# Patient Record
Sex: MALE | Race: WHITE | NOT HISPANIC OR LATINO | Employment: OTHER | URBAN - METROPOLITAN AREA
[De-identification: names, ages, dates, MRNs, and addresses within clinical notes are randomized per-mention and may not be internally consistent; named-entity substitution may affect disease eponyms.]

---

## 2023-08-30 LAB
ALANINE AMINOTRANSFERASE (SGPT) (U/L) IN SER/PLAS: 16 U/L (ref 10–52)
ALBUMIN (G/DL) IN SER/PLAS: 4.2 G/DL (ref 3.4–5)
ALKALINE PHOSPHATASE (U/L) IN SER/PLAS: 84 U/L (ref 33–136)
ANION GAP IN SER/PLAS: 12 MMOL/L (ref 10–20)
ASPARTATE AMINOTRANSFERASE (SGOT) (U/L) IN SER/PLAS: 23 U/L (ref 9–39)
BILIRUBIN TOTAL (MG/DL) IN SER/PLAS: 0.6 MG/DL (ref 0–1.2)
CALCIUM (MG/DL) IN SER/PLAS: 9.7 MG/DL (ref 8.6–10.6)
CARBON DIOXIDE, TOTAL (MMOL/L) IN SER/PLAS: 31 MMOL/L (ref 21–32)
CHLORIDE (MMOL/L) IN SER/PLAS: 101 MMOL/L (ref 98–107)
CREATININE (MG/DL) IN SER/PLAS: 1.07 MG/DL (ref 0.5–1.3)
ERYTHROCYTE DISTRIBUTION WIDTH (RATIO) BY AUTOMATED COUNT: 13.8 % (ref 11.5–14.5)
ERYTHROCYTE MEAN CORPUSCULAR HEMOGLOBIN CONCENTRATION (G/DL) BY AUTOMATED: 32 G/DL (ref 32–36)
ERYTHROCYTE MEAN CORPUSCULAR VOLUME (FL) BY AUTOMATED COUNT: 95 FL (ref 80–100)
ERYTHROCYTES (10*6/UL) IN BLOOD BY AUTOMATED COUNT: 4.36 X10E12/L (ref 4.5–5.9)
GFR MALE: 70 ML/MIN/1.73M2
GLUCOSE (MG/DL) IN SER/PLAS: 93 MG/DL (ref 74–99)
HEMATOCRIT (%) IN BLOOD BY AUTOMATED COUNT: 41.5 % (ref 41–52)
HEMOGLOBIN (G/DL) IN BLOOD: 13.3 G/DL (ref 13.5–17.5)
LEUKOCYTES (10*3/UL) IN BLOOD BY AUTOMATED COUNT: 9.5 X10E9/L (ref 4.4–11.3)
NRBC (PER 100 WBCS) BY AUTOMATED COUNT: 0 /100 WBC (ref 0–0)
PLATELETS (10*3/UL) IN BLOOD AUTOMATED COUNT: 335 X10E9/L (ref 150–450)
POTASSIUM (MMOL/L) IN SER/PLAS: 3.5 MMOL/L (ref 3.5–5.3)
PROTEIN TOTAL: 6.8 G/DL (ref 6.4–8.2)
SODIUM (MMOL/L) IN SER/PLAS: 140 MMOL/L (ref 136–145)
UREA NITROGEN (MG/DL) IN SER/PLAS: 18 MG/DL (ref 6–23)

## 2024-02-06 ENCOUNTER — HOSPITAL ENCOUNTER (EMERGENCY)
Facility: HOSPITAL | Age: 79
Discharge: HOME | End: 2024-02-06
Attending: EMERGENCY MEDICINE
Payer: MEDICARE

## 2024-02-06 ENCOUNTER — APPOINTMENT (OUTPATIENT)
Dept: RADIOLOGY | Facility: HOSPITAL | Age: 79
End: 2024-02-06
Payer: MEDICARE

## 2024-02-06 VITALS
HEIGHT: 70 IN | SYSTOLIC BLOOD PRESSURE: 192 MMHG | HEART RATE: 96 BPM | OXYGEN SATURATION: 97 % | BODY MASS INDEX: 31.5 KG/M2 | DIASTOLIC BLOOD PRESSURE: 95 MMHG | TEMPERATURE: 97.7 F | RESPIRATION RATE: 18 BRPM | WEIGHT: 220 LBS

## 2024-02-06 DIAGNOSIS — S01.81XA FACIAL LACERATION, INITIAL ENCOUNTER: ICD-10-CM
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
## 2024-02-06 PROCEDURE — 99285 EMERGENCY DEPT VISIT HI MDM: CPT | Mod: 25

## 2024-02-06 PROCEDURE — 12011 RPR F/E/E/N/L/M 2.5 CM/<: CPT | Performed by: EMERGENCY MEDICINE

## 2024-02-06 PROCEDURE — 70450 CT HEAD/BRAIN W/O DYE: CPT | Performed by: STUDENT IN AN ORGANIZED HEALTH CARE EDUCATION/TRAINING PROGRAM

## 2024-02-06 PROCEDURE — 72125 CT NECK SPINE W/O DYE: CPT | Performed by: STUDENT IN AN ORGANIZED HEALTH CARE EDUCATION/TRAINING PROGRAM

## 2024-02-06 PROCEDURE — 72125 CT NECK SPINE W/O DYE: CPT

## 2024-02-06 PROCEDURE — 70450 CT HEAD/BRAIN W/O DYE: CPT

## 2024-02-06 NOTE — ED TRIAGE NOTES
BIBA, patient unable to state what happened, does not remember, hx of Alzheimer's, on low dose ASA, per squad, patient [place] curb by the [place] hitting the left side of his head, had head lac on left side of upper face/head, no complaints of pain

## 2024-02-06 NOTE — ED PROVIDER NOTES
HPI   Chief Complaint   Patient presents with    Fall     BIBA, patient unable to state what happened, does not remember, hx of Alzheimer's, on low dose ASA, per squad, patient fell [place] a curb by the library hitting the left side of his head, had head lac on left side of upper face/head, no complaints of pain       Patient is a 78-year-old male, medical history significant for mild dementia, dizziness, who presents to the emergency department for fall.  Patient was on a community bus from [facility].  He apparently tripped and fell.  He struck his head.  There was no loss of consciousness.  Patient did suffer a laceration.  EMS was called to the scene and brought the patient in.  He denies feeling lightheaded prior to arrival.  He cannot recall all the details of the fall.  He states he is otherwise been in his normal state of health.                          No data recorded                   Patient History   Past Medical History:   Diagnosis Date    Disease of stomach and duodenum, unspecified 05/15/2013    Gastric and duodenal disease    Elevated prostate specific antigen (PSA)     Elevated prostate specific antigen (PSA)    Other conditions influencing health status     Nephrolithiasis    Other hypoglycemia 07/17/2017    Fasting low blood sugar    Other infective otitis externa, bilateral 04/08/2016    Other infective acute otitis externa of both ears    Other noninfective acute otitis externa, right ear 12/01/2016    Other noninfective acute otitis externa of right ear    Personal history of other diseases of male genital organs     History of benign prostatic hypertrophy    Personal history of other diseases of the digestive system 08/12/2019    History of gastroesophageal reflux (GERD)    Personal history of other diseases of the nervous system and sense organs 06/04/2019    History of blepharitis    Personal history of other diseases of the nervous system and sense organs 06/04/2019    History of cataract     Personal history of other diseases of the nervous system and sense organs 11/14/2013    History of acute otitis externa    Personal history of other diseases of the nervous system and sense organs     History of acute conjunctivitis    Personal history of other diseases of the respiratory system 07/09/2014    History of asthma    Personal history of other diseases of the respiratory system     Personal history of asthma    Personal history of other specified conditions 01/11/2016    History of dizziness    Personal history of other specified conditions 01/09/2014    History of diarrhea    Torticollis 08/07/2018    Torticollis, acute     Past Surgical History:   Procedure Laterality Date    CATARACT EXTRACTION  07/09/2014    Cataract Surgery    CATARACT EXTRACTION  07/09/2014    Cataract Surgery    COLONOSCOPY  05/15/2013    Complete Colonoscopy    HERNIA REPAIR  04/10/2013    Inguinal Hernia Repair    OTHER SURGICAL HISTORY  08/12/2019    Knee arthroscopy    OTHER SURGICAL HISTORY  12/17/2018    Cataract surgery    OTHER SURGICAL HISTORY  12/17/2018    YAG laser capsulotomy    OTHER SURGICAL HISTORY  06/26/2018    Laser Suite Retina Treatment Consisted Of    SEPTOPLASTY  05/15/2013    Septoplasty     No family history on file.  Social History     Tobacco Use    Smoking status: Not on file    Smokeless tobacco: Not on file   Substance Use Topics    Alcohol use: Not on file    Drug use: Not on file       Physical Exam   ED Triage Vitals [02/06/24 1620]   Temperature Heart Rate Respirations BP   36.5 °C (97.7 °F) 96 18 (!) 192/95      Pulse Ox Temp Source Heart Rate Source Patient Position   97 % Skin Monitor Sitting      BP Location FiO2 (%)     Left arm --       Physical Exam  Vitals and nursing note reviewed.   Constitutional:       General: He is not in acute distress.     Appearance: He is well-developed.   HENT:      Head: Normocephalic. Laceration present.        Comments: 2 cm full-thickness laceration just  above the left eyebrow.  No step-off.  Eyes:      Conjunctiva/sclera: Conjunctivae normal.   Cardiovascular:      Rate and Rhythm: Normal rate and regular rhythm.      Heart sounds: No murmur heard.  Pulmonary:      Effort: Pulmonary effort is normal. No respiratory distress.      Breath sounds: Normal breath sounds.   Abdominal:      Palpations: Abdomen is soft.      Tenderness: There is no abdominal tenderness.   Musculoskeletal:         General: No swelling.      Cervical back: Neck supple.   Skin:     General: Skin is warm and dry.      Capillary Refill: Capillary refill takes less than 2 seconds.   Neurological:      Mental Status: He is alert.   Psychiatric:         Mood and Affect: Mood normal.         ED Course & MDM   ED Course as of 02/06/24 1737 Tue Feb 06, 2024 1735 CT head shows no acute evidence of intracranial hemorrhage.  CT cervical spine shows no fracture or dislocation. [MK]      ED Course User Index  [MK] Favian Parada MD         Diagnoses as of 02/06/24 1737   Fall, initial encounter   Facial laceration, initial encounter       Medical Decision Making  Medical Decision Making: Patient presents emergency department after fall.  He did strike his head.  He has a laceration that was irrigated and repaired.  See procedure note.  Given patient's advanced age, I did obtain CT imaging.  CT shows no evidence of acute intracranial hemorrhage.  CT cervical spine is also unremarkable.  At this point, patient has no further complaints.  I do feel that he is safe for discharge back to his nursing facility.    Differential Diagnoses Considered: Laceration, skull fracture, intracranial hemorrhage, concussion    Chronic Medical Conditions Significantly Affecting Care: Dementia, BPH    External Records Reviewed: I reviewed recent and relevant outside records including: Medication reconciliation    Independent Interpretation of Studies:  I independently interpreted: CT is obtained reviewed    Escalation  of Care:  Appropriate for outpatient management    Social Determinants of Health Significantly Affecting Care:  No social determinants    Prescription Drug Consideration: Tetanus update    Diagnostic testing considered: Not contributory            Procedure  Procedures     Favian Parada MD  02/06/24 [id number]

## 2024-02-07 ENCOUNTER — OFFICE VISIT (OUTPATIENT)
Dept: NEUROLOGY | Facility: CLINIC | Age: 79
End: 2024-02-07
Payer: MEDICARE

## 2024-02-07 VITALS
HEART RATE: 88 BPM | HEIGHT: 68 IN | SYSTOLIC BLOOD PRESSURE: 116 MMHG | WEIGHT: 183.1 LBS | BODY MASS INDEX: 27.75 KG/M2 | DIASTOLIC BLOOD PRESSURE: 71 MMHG

## 2024-02-07 DIAGNOSIS — R41.89 COGNITIVE IMPAIRMENT: ICD-10-CM

## 2024-02-07 DIAGNOSIS — G04.90 ENCEPHALITIS (HHS-HCC): Primary | ICD-10-CM

## 2024-02-07 PROCEDURE — 1126F AMNT PAIN NOTED NONE PRSNT: CPT | Performed by: PSYCHIATRY & NEUROLOGY

## 2024-02-07 PROCEDURE — 1159F MED LIST DOCD IN RCRD: CPT | Performed by: PSYCHIATRY & NEUROLOGY

## 2024-02-07 PROCEDURE — 1157F ADVNC CARE PLAN IN RCRD: CPT | Performed by: PSYCHIATRY & NEUROLOGY

## 2024-02-07 PROCEDURE — 99213 OFFICE O/P EST LOW 20 MIN: CPT | Performed by: PSYCHIATRY & NEUROLOGY

## 2024-02-07 PROCEDURE — 1036F TOBACCO NON-USER: CPT | Performed by: PSYCHIATRY & NEUROLOGY

## 2024-02-07 RX ORDER — FINASTERIDE 5 MG/1
5 TABLET, FILM COATED ORAL EVERY 24 HOURS
COMMUNITY
Start: 2013-04-01

## 2024-02-07 RX ORDER — ACETAMINOPHEN 325 MG/1
325 TABLET ORAL
COMMUNITY
Start: 2023-07-03

## 2024-02-07 RX ORDER — PREDNISOLONE ACETATE 10 MG/ML
1 SUSPENSION/ DROPS OPHTHALMIC
COMMUNITY
Start: 2024-01-08

## 2024-02-07 RX ORDER — NAPROXEN SODIUM 220 MG/1
81 TABLET, FILM COATED ORAL DAILY
COMMUNITY
Start: 2024-02-02

## 2024-02-07 RX ORDER — METOPROLOL SUCCINATE 25 MG/1
25 TABLET, EXTENDED RELEASE ORAL DAILY
COMMUNITY
Start: 2020-10-15

## 2024-02-07 RX ORDER — AMITRIPTYLINE HYDROCHLORIDE 10 MG/1
10 TABLET, FILM COATED ORAL EVERY 24 HOURS
COMMUNITY
Start: 2022-05-05

## 2024-02-07 RX ORDER — OXCARBAZEPINE 300 MG/1
300 TABLET, FILM COATED ORAL 2 TIMES DAILY
COMMUNITY
Start: 2024-02-02

## 2024-02-07 RX ORDER — HYDROCHLOROTHIAZIDE 25 MG/1
25 TABLET ORAL EVERY 24 HOURS
COMMUNITY
Start: 2013-01-27

## 2024-02-07 RX ORDER — ATORVASTATIN CALCIUM 80 MG/1
80 TABLET, FILM COATED ORAL DAILY
COMMUNITY
Start: 2020-10-15

## 2024-02-07 ASSESSMENT — PAIN SCALES - GENERAL: PAINLEVEL: 0-NO PAIN

## 2024-02-07 NOTE — PROGRESS NOTES
"Subjective   Timo Crawford is a 78 y.o. male who comes in for follow up of face pain.     He is having some memory problems.  He has had a worsening of his memory.  He had the shingles two years ago this month.  He is aware that he is confused and having difficult getting words.   He reports that he reads a lot. He has difficulty remembering peoples names.  He was getting speech therapy. He was getting that in the facility.      He still does his adls - dressing, bathing however they are in the memory care unit so they do the medications, meals etc.  They have a POA that does all their finances.      She is now sundowning at night.  This has been happening since October.      The face pain is mostly gone at this time.       Review of Systems    Objective   /71 (BP Location: Right arm, Patient Position: Sitting, BP Cuff Size: Adult)   Pulse 88   Ht 1.727 m (5' 8\")   Wt 83.1 kg (183 lb 1.6 oz)   BMI 27.84 kg/m²   Neurological Exam  Physical Exam    Patient unable to draw clock today - put numbers on clock (almost ran out of space however got them all on) however did not understand how to put     Assessment/Plan   Mr. Crawford is a 78 year old man presenting for follow up of post-herpetic neuraglia and cognitive impairment. Pain has resolved with low dose oxcarbazepine. Will continue and check RFP today.    Cognition has worsened after being stable per patient. Given history of encephalitis will get MRI brain to follow up and consider starting Aricept depending on results. Recommend to facility to stop amitriptyline which might be contributing to sundowning.     Follow up in 6 months.   "

## 2024-02-07 NOTE — PATIENT INSTRUCTIONS
Continue the oxcarbazepine for the face pain.  I would recommend that the amitriptyline be stopped as it can cause sundowning and confusion at night. To follow up with memory changes will get an MRI brain w/wo contrast.  Depending on the results will start a medication for memory.  Start speech therapy for cognitive therapy. Follow up in 6 months.

## 2024-02-19 NOTE — ED PROCEDURE NOTE
Procedure  Laceration Repair    Performed by: Favian Parada MD  Authorized by: Favian Parada MD    Consent:     Consent obtained:  Verbal    Consent given by:  Patient    Risks, benefits, and alternatives were discussed: yes      Risks discussed:  Infection, pain and retained foreign body  Anesthesia:     Anesthesia method:  Local infiltration    Local anesthetic:  Lidocaine 1% WITH epi  Laceration details:     Location:  Face    Face location:  L eyebrow    Length (cm):  2    Depth (mm):  3  Pre-procedure details:     Preparation:  Patient was prepped and draped in usual sterile fashion and imaging obtained to evaluate for foreign bodies  Exploration:     Hemostasis achieved with:  Direct pressure    Imaging outcome: foreign body not noted      Wound exploration: wound explored through full range of motion    Treatment:     Area cleansed with:  Saline    Amount of cleaning:  Standard    Irrigation solution:  Sterile saline    Irrigation method:  Syringe    Visualized foreign bodies/material removed: no      Debridement:  None    Undermining:  None    Scar revision: no    Skin repair:     Repair method:  Sutures    Suture size:  5-0    Suture material:  Nylon    Suture technique:  Simple interrupted    Number of sutures:  3  Approximation:     Approximation:  Close  Repair type:     Repair type:  Simple  Post-procedure details:     Dressing:  Open (no dressing)    Procedure completion:  Tolerated               Favian Parada MD  02/19/24 [id number]

## 2024-03-04 ENCOUNTER — APPOINTMENT (OUTPATIENT)
Dept: RADIOLOGY | Facility: HOSPITAL | Age: 79
End: 2024-03-04
Payer: MEDICARE

## 2024-03-06 ENCOUNTER — TELEPHONE (OUTPATIENT)
Dept: NEUROLOGY | Facility: CLINIC | Age: 79
End: 2024-03-06
Payer: MEDICARE

## 2024-03-06 NOTE — TELEPHONE ENCOUNTER
----- Message from Anne Mera DO sent at 3/5/2024  8:57 AM EST -----  Can you call the facility back about this note? My understanding is that this medication change greatly helped his pain, do they agree? What time of the day are they seeing the behavior changes.   ----- Message -----  From: Haylee Gallegos MA  Sent: 3/1/2024   9:14 AM EST  To: Anne Mera, DO    Please advise

## 2024-03-06 NOTE — TELEPHONE ENCOUNTER
----- Message from Anne Mera DO sent at 3/6/2024  3:50 PM EST -----  Ok they can update me after he has recovered from his current infection.   ----- Message -----  From: Haylee Gallegos MA  Sent: 3/5/2024  10:35 AM EST  To: Anne Mera DO    Spoke to a Haylee [nurse]  from the facility--  tested + for Covid on 2/29/24 when the fax was sent over. She  said it's hard to tell if what is going on is due to Covid or something else.  ----- Message -----  From: Anne Mera DO  Sent: 3/5/2024   8:58 AM EST  To: Haylee Gallegos MA    Can you call the facility back about this note? My understanding is that this medication change greatly helped his pain, do they agree? What time of the day are they seeing the behavior changes.   ----- Message -----  From: Haylee Gallegos MA  Sent: 3/1/2024   9:14 AM EST  To: Anne Mera DO    Please advise

## 2024-03-13 ENCOUNTER — HOSPITAL ENCOUNTER (OUTPATIENT)
Dept: RADIOLOGY | Facility: HOSPITAL | Age: 79
Discharge: HOME | End: 2024-03-13
Payer: MEDICARE

## 2024-03-13 DIAGNOSIS — G04.90 ENCEPHALITIS (HHS-HCC): ICD-10-CM

## 2024-03-13 PROCEDURE — 2550000001 HC RX 255 CONTRASTS: Performed by: PSYCHIATRY & NEUROLOGY

## 2024-03-13 PROCEDURE — 70553 MRI BRAIN STEM W/O & W/DYE: CPT | Performed by: STUDENT IN AN ORGANIZED HEALTH CARE EDUCATION/TRAINING PROGRAM

## 2024-03-13 PROCEDURE — A9575 INJ GADOTERATE MEGLUMI 0.1ML: HCPCS | Performed by: PSYCHIATRY & NEUROLOGY

## 2024-03-13 PROCEDURE — 70553 MRI BRAIN STEM W/O & W/DYE: CPT

## 2024-03-13 RX ORDER — GADOTERATE MEGLUMINE 376.9 MG/ML
16 INJECTION INTRAVENOUS
Status: COMPLETED | OUTPATIENT
Start: 2024-03-13 | End: 2024-03-13

## 2024-03-13 RX ADMIN — GADOTERATE MEGLUMINE 16 ML: 376.9 INJECTION INTRAVENOUS at 14:15

## 2024-07-17 ENCOUNTER — NURSE TRIAGE (OUTPATIENT)
Dept: PRIMARY CARE | Facility: CLINIC | Age: 79
End: 2024-07-17
Payer: MEDICARE

## 2024-08-29 ENCOUNTER — APPOINTMENT (OUTPATIENT)
Dept: NEUROLOGY | Facility: CLINIC | Age: 79
End: 2024-08-29
Payer: MEDICARE

## 2024-08-29 VITALS
HEIGHT: 71 IN | HEART RATE: 84 BPM | SYSTOLIC BLOOD PRESSURE: 124 MMHG | WEIGHT: 167.3 LBS | DIASTOLIC BLOOD PRESSURE: 73 MMHG | BODY MASS INDEX: 23.42 KG/M2

## 2024-08-29 DIAGNOSIS — R41.89 COGNITIVE IMPAIRMENT: Primary | ICD-10-CM

## 2024-08-29 DIAGNOSIS — G04.90 ENCEPHALITIS (HHS-HCC): ICD-10-CM

## 2024-08-29 DIAGNOSIS — B02.29 POST HERPETIC NEURALGIA: ICD-10-CM

## 2024-08-29 PROCEDURE — 1159F MED LIST DOCD IN RCRD: CPT | Performed by: PSYCHIATRY & NEUROLOGY

## 2024-08-29 PROCEDURE — 1157F ADVNC CARE PLAN IN RCRD: CPT | Performed by: PSYCHIATRY & NEUROLOGY

## 2024-08-29 PROCEDURE — 1126F AMNT PAIN NOTED NONE PRSNT: CPT | Performed by: PSYCHIATRY & NEUROLOGY

## 2024-08-29 PROCEDURE — 99214 OFFICE O/P EST MOD 30 MIN: CPT | Performed by: PSYCHIATRY & NEUROLOGY

## 2024-08-29 ASSESSMENT — PATIENT HEALTH QUESTIONNAIRE - PHQ9
6. FEELING BAD ABOUT YOURSELF - OR THAT YOU ARE A FAILURE OR HAVE LET YOURSELF OR YOUR FAMILY DOWN: SEVERAL DAYS
8. MOVING OR SPEAKING SO SLOWLY THAT OTHER PEOPLE COULD HAVE NOTICED. OR THE OPPOSITE, BEING SO FIGETY OR RESTLESS THAT YOU HAVE BEEN MOVING AROUND A LOT MORE THAN USUAL: NEARLY EVERY DAY
9. THOUGHTS THAT YOU WOULD BE BETTER OFF DEAD, OR OF HURTING YOURSELF: NOT AT ALL
SUM OF ALL RESPONSES TO PHQ QUESTIONS 1-9: 12
7. TROUBLE CONCENTRATING ON THINGS, SUCH AS READING THE NEWSPAPER OR WATCHING TELEVISION: NEARLY EVERY DAY
1. LITTLE INTEREST OR PLEASURE IN DOING THINGS: NEARLY EVERY DAY
SUM OF ALL RESPONSES TO PHQ9 QUESTIONS 1 AND 2: 3
2. FEELING DOWN, DEPRESSED OR HOPELESS: NOT AT ALL
5. POOR APPETITE OR OVEREATING: SEVERAL DAYS
4. FEELING TIRED OR HAVING LITTLE ENERGY: SEVERAL DAYS
3. TROUBLE FALLING OR STAYING ASLEEP OR SLEEPING TOO MUCH: NOT AT ALL

## 2024-08-29 ASSESSMENT — PAIN SCALES - GENERAL: PAINLEVEL: 0-NO PAIN

## 2024-08-29 ASSESSMENT — ENCOUNTER SYMPTOMS
CONFUSION: 1
SPEECH DIFFICULTY: 1

## 2024-08-29 NOTE — PROGRESS NOTES
"Subjective   Timo Crawford is a 79 y.o. male who comes in for follow up of memory loss and post-herpetic neuralgia.    He was getting speech therapy and was much more lucid while doing the therapy.  They are felt that was really helping him.  They would not approve more since he had not had neurology follow up.    He enjoyed having her come to visit him and do the therapy.  Taking a nap in the afternoon was reducing the sundowning.  He is sleeping a lot both during the day and at night. He does go do daily exercises in the facility.  He walks a lot and all over the facility.   He does not have any problems recognizing people. He does not talk at much as he used too.  Sometimes he uses made up words.     They stopped the amitriptyline and that helped with his confusion.     Review of Systems   Neurological:  Positive for speech difficulty.   Psychiatric/Behavioral:  Positive for confusion.    All other systems reviewed and are negative.      Objective   /73 (BP Location: Right arm, Patient Position: Sitting, BP Cuff Size: Adult)   Pulse 84   Ht 1.803 m (5' 11\")   Wt 75.9 kg (167 lb 4.8 oz)   BMI 23.33 kg/m²   Neurological Exam  Physical Exam    Limited speech output.  Unable to draw a clock - make a [place], draws lines from center to 3, however unable to place numbers even with prompts.         MR brain w and wo IV contrast    Result Date: 3/13/2024  Interpreted By:  Alexsander Syed,  and Elia Carney STUDY: MR BRAIN W AND WO IV CONTRAST;  3/13/2024 2:35 pm   INDICATION: Signs/Symptoms: worsening cognitive changes, history of neuro-shingles..   COMPARISON: CT head dated 02/06/2024 and multiple prior brain MRIs, most recently 05/13/2022   ACCESSION NUMBER(S): [id number]   ORDERING CLINICIAN: PACHECO MELO   TECHNIQUE: Axial T2, FLAIR, DWI, gradient echo T2 and T1 weighted images of brain were acquired. Post contrast T1 weighted images were acquired after administration of 16 mL Dotarem gadolinium " based intravenous contrast.   FINDINGS: CSF Spaces: The ventricles, sulci and basal cisterns are appropriate for the degree of volume loss. No abnormal extra-axial fluid collection.   Parenchyma: Generalized parenchymal loss, similar to previous. The previously noted signal abnormality in the right aspect of the kaiden and medulla is no longer visualized. No restricted diffusion to suggest acute infarction. Additional nonspecific T2 hyperintense signal in the white matter is similar to previous and likely secondary to chronic small vessel ischemic disease. There is no mass effect or midline shift. No evidence of intracranial hemorrhage. No abnormal enhancement.   Paranasal Sinuses and Mastoids: Scattered paranasal sinus mucosal thickening. The mastoid air cells are clear.   Orbits: Bilateral lens replacements.       Resolution of the previously noted abnormal brainstem signal, no acute intracranial pathology.   I personally reviewed the images/study and I agree with the findings as stated by resident physician Dr. Mike Rodrigez.   MACRO: None   Signed by: Alexsander Syed 3/13/2024 4:14 PM Dictation workstation:   [id number]          Assessment/Plan   Mr. Crawford is a 78 year old man presenting for follow up of post-herpetic neuraglia and cognitive impairment. Pain has resolved with low dose oxcarbazepine. Will try to lower to 150 mg BID.  Check renal function panel.      Cognition improved with speech therapy however insurance did not approve for it to continue. Will re-order. Consider adding Aricept.    Follow up in 6 months.    Anne Mera DO

## 2024-08-29 NOTE — PATIENT INSTRUCTIONS
Will restart the speech therapy. Depending on response can consider starting medication for memory. You can call me and if you are not seeing the same benefit from the speech therapy or its not persistent improvement then will added on the medication.      Will try to cut the oxcarbazepine to 150 mg twice a day since you are not having the pain from the shingles any longer. If the pain comes back with lowering the dose then increase it back to 300  mg twice a day.     Follow up in 6 months.

## 2024-09-21 ENCOUNTER — APPOINTMENT (OUTPATIENT)
Dept: RADIOLOGY | Facility: HOSPITAL | Age: 79
DRG: 101 | End: 2024-09-21
Payer: MEDICARE

## 2024-09-21 ENCOUNTER — HOSPITAL ENCOUNTER (OUTPATIENT)
Facility: HOSPITAL | Age: 79
Setting detail: OBSERVATION
DRG: 101 | End: 2024-09-21
Attending: EMERGENCY MEDICINE | Admitting: INTERNAL MEDICINE
Payer: MEDICARE

## 2024-09-21 ENCOUNTER — APPOINTMENT (OUTPATIENT)
Dept: CARDIOLOGY | Facility: HOSPITAL | Age: 79
DRG: 101 | End: 2024-09-21
Payer: MEDICARE

## 2024-09-21 DIAGNOSIS — R56.9 SEIZURE (MULTI): ICD-10-CM

## 2024-09-21 DIAGNOSIS — R00.1 BRADYCARDIA: ICD-10-CM

## 2024-09-21 DIAGNOSIS — R55 SYNCOPE AND COLLAPSE: ICD-10-CM

## 2024-09-21 DIAGNOSIS — R40.4 UNRESPONSIVE EPISODE: Primary | ICD-10-CM

## 2024-09-21 DIAGNOSIS — I25.2 HISTORY OF NON-ST ELEVATION MYOCARDIAL INFARCTION (NSTEMI): ICD-10-CM

## 2024-09-21 DIAGNOSIS — G45.9 TRANSIENT CEREBRAL ISCHEMIA, UNSPECIFIED TYPE: ICD-10-CM

## 2024-09-21 PROBLEM — I25.10 CORONARY ARTERY DISEASE: Status: ACTIVE | Noted: 2022-05-26

## 2024-09-21 PROBLEM — I10 HYPERTENSION: Status: ACTIVE | Noted: 2024-09-21

## 2024-09-21 PROBLEM — G04.90 ENCEPHALITIS (HHS-HCC): Status: ACTIVE | Noted: 2024-09-21

## 2024-09-21 PROBLEM — J45.909 ASTHMA: Status: ACTIVE | Noted: 2024-09-21

## 2024-09-21 PROBLEM — E78.5 HYPERLIPIDEMIA: Status: ACTIVE | Noted: 2022-05-26

## 2024-09-21 PROBLEM — R13.12 DYSPHAGIA, OROPHARYNGEAL PHASE: Status: ACTIVE | Noted: 2022-05-26

## 2024-09-21 PROBLEM — N40.0 BENIGN PROSTATIC HYPERPLASIA: Status: ACTIVE | Noted: 2024-09-21

## 2024-09-21 PROBLEM — I10 BENIGN ESSENTIAL HYPERTENSION: Status: ACTIVE | Noted: 2024-09-21
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
## 2024-09-21 LAB
ALBUMIN SERPL BCP-MCNC: 3.9 G/DL (ref 3.4–5)
ALP SERPL-CCNC: 69 U/L (ref 33–136)
ALT SERPL W P-5'-P-CCNC: 17 U/L (ref 10–52)
ANION GAP BLDV CALCULATED.4IONS-SCNC: 8 MMOL/L (ref 10–25)
ANION GAP SERPL CALC-SCNC: 12 MMOL/L (ref 10–20)
AST SERPL W P-5'-P-CCNC: 21 U/L (ref 9–39)
BASE EXCESS BLDV CALC-SCNC: 1.7 MMOL/L (ref -2–3)
BASOPHILS # BLD AUTO: 0.04 X10*3/UL (ref 0–0.1)
BASOPHILS NFR BLD AUTO: 0.5 %
BILIRUB DIRECT SERPL-MCNC: 0.1 MG/DL (ref 0–0.3)
BILIRUB SERPL-MCNC: 0.7 MG/DL (ref 0–1.2)
BNP SERPL-MCNC: 46 PG/ML (ref 0–99)
BODY TEMPERATURE: 37 DEGREES CELSIUS
BUN SERPL-MCNC: 20 MG/DL (ref 6–23)
CA-I BLDV-SCNC: 1.17 MMOL/L (ref 1.1–1.33)
CALCIUM SERPL-MCNC: 9.4 MG/DL (ref 8.6–10.3)
CARDIAC TROPONIN I PNL SERPL HS: 5 NG/L (ref 0–20)
CARDIAC TROPONIN I PNL SERPL HS: 6 NG/L (ref 0–20)
CHLORIDE BLDV-SCNC: 104 MMOL/L (ref 98–107)
CHLORIDE SERPL-SCNC: 101 MMOL/L (ref 98–107)
CO2 SERPL-SCNC: 27 MMOL/L (ref 21–32)
CREAT SERPL-MCNC: 0.95 MG/DL (ref 0.5–1.3)
EGFRCR SERPLBLD CKD-EPI 2021: 81 ML/MIN/1.73M*2
EOSINOPHIL # BLD AUTO: 0.19 X10*3/UL (ref 0–0.4)
EOSINOPHIL NFR BLD AUTO: 2.3 %
ERYTHROCYTE [DISTWIDTH] IN BLOOD BY AUTOMATED COUNT: 13.7 % (ref 11.5–14.5)
GLUCOSE BLD MANUAL STRIP-MCNC: 108 MG/DL (ref 74–99)
GLUCOSE BLDV-MCNC: 94 MG/DL (ref 74–99)
GLUCOSE SERPL-MCNC: 112 MG/DL (ref 74–99)
HCO3 BLDV-SCNC: 26.6 MMOL/L (ref 22–26)
HCT VFR BLD AUTO: 39.5 % (ref 41–52)
HCT VFR BLD EST: 38 % (ref 41–52)
HGB BLD-MCNC: 13.6 G/DL (ref 13.5–17.5)
HGB BLDV-MCNC: 12.6 G/DL (ref 13.5–17.5)
IMM GRANULOCYTES # BLD AUTO: 0.04 X10*3/UL (ref 0–0.5)
IMM GRANULOCYTES NFR BLD AUTO: 0.5 % (ref 0–0.9)
INHALED O2 CONCENTRATION: 21 %
LACTATE BLDV-SCNC: 0.9 MMOL/L (ref 0.4–2)
LYMPHOCYTES # BLD AUTO: 1.66 X10*3/UL (ref 0.8–3)
LYMPHOCYTES NFR BLD AUTO: 19.9 %
MAGNESIUM SERPL-MCNC: 1.59 MG/DL (ref 1.6–2.4)
MCH RBC QN AUTO: 32.2 PG (ref 26–34)
MCHC RBC AUTO-ENTMCNC: 34.4 G/DL (ref 32–36)
MCV RBC AUTO: 93 FL (ref 80–100)
MONOCYTES # BLD AUTO: 0.72 X10*3/UL (ref 0.05–0.8)
MONOCYTES NFR BLD AUTO: 8.6 %
NEUTROPHILS # BLD AUTO: 5.69 X10*3/UL (ref 1.6–5.5)
NEUTROPHILS NFR BLD AUTO: 68.2 %
NRBC BLD-RTO: 0 /100 WBCS (ref 0–0)
OXYHGB MFR BLDV: 57.1 % (ref 45–75)
PCO2 BLDV: 42 MM HG (ref 41–51)
PH BLDV: 7.41 PH (ref 7.33–7.43)
PLATELET # BLD AUTO: 337 X10*3/UL (ref 150–450)
PO2 BLDV: 38 MM HG (ref 35–45)
POTASSIUM BLDV-SCNC: 3.3 MMOL/L (ref 3.5–5.3)
POTASSIUM SERPL-SCNC: 3.6 MMOL/L (ref 3.5–5.3)
PROT SERPL-MCNC: 6.5 G/DL (ref 6.4–8.2)
RBC # BLD AUTO: 4.23 X10*6/UL (ref 4.5–5.9)
SAO2 % BLDV: 59 % (ref 45–75)
SODIUM BLDV-SCNC: 135 MMOL/L (ref 136–145)
SODIUM SERPL-SCNC: 136 MMOL/L (ref 136–145)
WBC # BLD AUTO: 8.3 X10*3/UL (ref 4.4–11.3)

## 2024-09-21 PROCEDURE — 84484 ASSAY OF TROPONIN QUANT: CPT | Performed by: EMERGENCY MEDICINE

## 2024-09-21 PROCEDURE — 36415 COLL VENOUS BLD VENIPUNCTURE: CPT | Performed by: EMERGENCY MEDICINE

## 2024-09-21 PROCEDURE — 84132 ASSAY OF SERUM POTASSIUM: CPT | Performed by: EMERGENCY MEDICINE

## 2024-09-21 PROCEDURE — 84075 ASSAY ALKALINE PHOSPHATASE: CPT | Performed by: EMERGENCY MEDICINE

## 2024-09-21 PROCEDURE — 96365 THER/PROPH/DIAG IV INF INIT: CPT | Mod: 59

## 2024-09-21 PROCEDURE — 83880 ASSAY OF NATRIURETIC PEPTIDE: CPT | Performed by: EMERGENCY MEDICINE

## 2024-09-21 PROCEDURE — 2500000004 HC RX 250 GENERAL PHARMACY W/ HCPCS (ALT 636 FOR OP/ED): Performed by: EMERGENCY MEDICINE

## 2024-09-21 PROCEDURE — 93005 ELECTROCARDIOGRAM TRACING: CPT

## 2024-09-21 PROCEDURE — 82947 ASSAY GLUCOSE BLOOD QUANT: CPT

## 2024-09-21 PROCEDURE — 85025 COMPLETE CBC W/AUTO DIFF WBC: CPT | Performed by: EMERGENCY MEDICINE

## 2024-09-21 PROCEDURE — 2500000004 HC RX 250 GENERAL PHARMACY W/ HCPCS (ALT 636 FOR OP/ED): Performed by: INTERNAL MEDICINE

## 2024-09-21 PROCEDURE — G0378 HOSPITAL OBSERVATION PER HR: HCPCS

## 2024-09-21 PROCEDURE — 70450 CT HEAD/BRAIN W/O DYE: CPT | Performed by: STUDENT IN AN ORGANIZED HEALTH CARE EDUCATION/TRAINING PROGRAM

## 2024-09-21 PROCEDURE — 70450 CT HEAD/BRAIN W/O DYE: CPT

## 2024-09-21 PROCEDURE — 96372 THER/PROPH/DIAG INJ SC/IM: CPT | Performed by: INTERNAL MEDICINE

## 2024-09-21 PROCEDURE — 83735 ASSAY OF MAGNESIUM: CPT | Performed by: EMERGENCY MEDICINE

## 2024-09-21 PROCEDURE — 99285 EMERGENCY DEPT VISIT HI MDM: CPT

## 2024-09-21 PROCEDURE — 96366 THER/PROPH/DIAG IV INF ADDON: CPT

## 2024-09-21 PROCEDURE — 99223 1ST HOSP IP/OBS HIGH 75: CPT | Performed by: INTERNAL MEDICINE

## 2024-09-21 PROCEDURE — 2500000001 HC RX 250 WO HCPCS SELF ADMINISTERED DRUGS (ALT 637 FOR MEDICARE OP): Performed by: INTERNAL MEDICINE

## 2024-09-21 PROCEDURE — 2500000002 HC RX 250 W HCPCS SELF ADMINISTERED DRUGS (ALT 637 FOR MEDICARE OP, ALT 636 FOR OP/ED): Performed by: INTERNAL MEDICINE

## 2024-09-21 PROCEDURE — 71046 X-RAY EXAM CHEST 2 VIEWS: CPT

## 2024-09-21 RX ORDER — AMITRIPTYLINE HYDROCHLORIDE 10 MG/1
10 TABLET, FILM COATED ORAL NIGHTLY
Status: DISCONTINUED | OUTPATIENT
Start: 2024-09-21 | End: 2024-09-25 | Stop reason: HOSPADM

## 2024-09-21 RX ORDER — DORZOLAMIDE HYDROCHLORIDE AND TIMOLOL MALEATE 20; 5 MG/ML; MG/ML
1 SOLUTION/ DROPS OPHTHALMIC 2 TIMES DAILY
Status: DISCONTINUED | OUTPATIENT
Start: 2024-09-21 | End: 2024-09-25 | Stop reason: HOSPADM

## 2024-09-21 RX ORDER — OXCARBAZEPINE 150 MG/1
150 TABLET, FILM COATED ORAL 2 TIMES DAILY
Status: DISCONTINUED | OUTPATIENT
Start: 2024-09-21 | End: 2024-09-22

## 2024-09-21 RX ORDER — DORZOLAMIDE HYDROCHLORIDE AND TIMOLOL MALEATE 20; 5 MG/ML; MG/ML
1 SOLUTION/ DROPS OPHTHALMIC 2 TIMES DAILY
COMMUNITY
Start: 2024-08-02

## 2024-09-21 RX ORDER — ACETAMINOPHEN 325 MG/1
650 TABLET ORAL EVERY 4 HOURS PRN
Status: DISCONTINUED | OUTPATIENT
Start: 2024-09-21 | End: 2024-09-25 | Stop reason: HOSPADM

## 2024-09-21 RX ORDER — METOPROLOL SUCCINATE 25 MG/1
25 TABLET, EXTENDED RELEASE ORAL DAILY
Status: DISCONTINUED | OUTPATIENT
Start: 2024-09-21 | End: 2024-09-25 | Stop reason: HOSPADM

## 2024-09-21 RX ORDER — ACETAMINOPHEN 650 MG/1
650 SUPPOSITORY RECTAL EVERY 4 HOURS PRN
Status: DISCONTINUED | OUTPATIENT
Start: 2024-09-21 | End: 2024-09-25 | Stop reason: HOSPADM

## 2024-09-21 RX ORDER — ENOXAPARIN SODIUM 100 MG/ML
40 INJECTION SUBCUTANEOUS EVERY 24 HOURS
Status: DISCONTINUED | OUTPATIENT
Start: 2024-09-21 | End: 2024-09-25 | Stop reason: HOSPADM

## 2024-09-21 RX ORDER — NAPROXEN SODIUM 220 MG/1
81 TABLET, FILM COATED ORAL DAILY
Status: DISCONTINUED | OUTPATIENT
Start: 2024-09-21 | End: 2024-09-25 | Stop reason: HOSPADM

## 2024-09-21 RX ORDER — FINASTERIDE 5 MG/1
5 TABLET, FILM COATED ORAL DAILY
Status: DISCONTINUED | OUTPATIENT
Start: 2024-09-21 | End: 2024-09-25 | Stop reason: HOSPADM

## 2024-09-21 RX ORDER — TALC
3 POWDER (GRAM) TOPICAL NIGHTLY PRN
Status: DISCONTINUED | OUTPATIENT
Start: 2024-09-21 | End: 2024-09-23

## 2024-09-21 RX ORDER — SODIUM CHLORIDE, SODIUM LACTATE, POTASSIUM CHLORIDE, CALCIUM CHLORIDE 600; 310; 30; 20 MG/100ML; MG/100ML; MG/100ML; MG/100ML
100 INJECTION, SOLUTION INTRAVENOUS CONTINUOUS
Status: DISCONTINUED | OUTPATIENT
Start: 2024-09-21 | End: 2024-09-23

## 2024-09-21 RX ORDER — EYELID CLEANSER COMBINATION 3
1 PADS, MEDICATED (EA) TOPICAL 2 TIMES DAILY
COMMUNITY
Start: 2024-09-09

## 2024-09-21 RX ORDER — ATORVASTATIN CALCIUM 80 MG/1
80 TABLET, FILM COATED ORAL NIGHTLY
Status: DISCONTINUED | OUTPATIENT
Start: 2024-09-21 | End: 2024-09-25 | Stop reason: HOSPADM

## 2024-09-21 RX ORDER — MAGNESIUM SULFATE HEPTAHYDRATE 40 MG/ML
2 INJECTION, SOLUTION INTRAVENOUS ONCE
Status: COMPLETED | OUTPATIENT
Start: 2024-09-21 | End: 2024-09-21

## 2024-09-21 RX ORDER — COAL TAR 5 MG/ML
1 SHAMPOO TOPICAL 2 TIMES WEEKLY
COMMUNITY
Start: 2023-10-26

## 2024-09-21 RX ORDER — ACETAMINOPHEN 160 MG/5ML
650 SOLUTION ORAL EVERY 4 HOURS PRN
Status: DISCONTINUED | OUTPATIENT
Start: 2024-09-21 | End: 2024-09-25 | Stop reason: HOSPADM

## 2024-09-21 RX ORDER — POLYETHYLENE GLYCOL 3350 17 G/17G
17 POWDER, FOR SOLUTION ORAL DAILY PRN
Status: DISCONTINUED | OUTPATIENT
Start: 2024-09-21 | End: 2024-09-25 | Stop reason: HOSPADM

## 2024-09-21 RX ORDER — CERAMIDE 1,3,6-II/SALICYLIC/B3
1 CLEANSER (ML) TOPICAL AS NEEDED
COMMUNITY

## 2024-09-21 RX ORDER — MAGNESIUM GLYCINATE 100 MG
1 CAPSULE ORAL DAILY
COMMUNITY
Start: 2024-09-11

## 2024-09-21 RX ORDER — HYDROCHLOROTHIAZIDE 25 MG/1
25 TABLET ORAL DAILY
Status: DISCONTINUED | OUTPATIENT
Start: 2024-09-21 | End: 2024-09-25 | Stop reason: HOSPADM

## 2024-09-21 RX ORDER — LACTOSE-REDUCED FOOD 0.04G-1.05
1 LIQUID (ML) ORAL
COMMUNITY
Start: 2024-09-10

## 2024-09-21 RX ADMIN — SODIUM CHLORIDE, POTASSIUM CHLORIDE, SODIUM LACTATE AND CALCIUM CHLORIDE 100 ML/HR: 600; 310; 30; 20 INJECTION, SOLUTION INTRAVENOUS at 17:32

## 2024-09-21 RX ADMIN — DORZOLAMIDE HYDROCHLORIDE TIMOLOL MALEATE 1 DROP: 20; 5 SOLUTION/ DROPS OPHTHALMIC at 21:48

## 2024-09-21 RX ADMIN — OXCARBAZEPINE 150 MG: 150 TABLET, FILM COATED ORAL at 21:48

## 2024-09-21 RX ADMIN — MAGNESIUM SULFATE HEPTAHYDRATE 2 G: 40 INJECTION, SOLUTION INTRAVENOUS at 13:48

## 2024-09-21 RX ADMIN — AMITRIPTYLINE HYDROCHLORIDE 10 MG: 10 TABLET, FILM COATED ORAL at 21:48

## 2024-09-21 RX ADMIN — ENOXAPARIN SODIUM 40 MG: 40 INJECTION SUBCUTANEOUS at 17:45

## 2024-09-21 RX ADMIN — ATORVASTATIN CALCIUM 80 MG: 80 TABLET, FILM COATED ORAL at 21:48

## 2024-09-21 SDOH — SOCIAL STABILITY: SOCIAL INSECURITY: ABUSE: ADULT

## 2024-09-21 SDOH — SOCIAL STABILITY: SOCIAL INSECURITY: DO YOU FEEL UNSAFE GOING BACK TO THE PLACE WHERE YOU ARE LIVING?: UNABLE TO ASSESS

## 2024-09-21 SDOH — SOCIAL STABILITY: SOCIAL INSECURITY: DOES ANYONE TRY TO KEEP YOU FROM HAVING/CONTACTING OTHER FRIENDS OR DOING THINGS OUTSIDE YOUR HOME?: UNABLE TO ASSESS

## 2024-09-21 SDOH — SOCIAL STABILITY: SOCIAL INSECURITY: DO YOU FEEL ANYONE HAS EXPLOITED OR TAKEN ADVANTAGE OF YOU FINANCIALLY OR OF YOUR PERSONAL PROPERTY?: UNABLE TO ASSESS

## 2024-09-21 SDOH — SOCIAL STABILITY: SOCIAL INSECURITY: HAVE YOU HAD ANY THOUGHTS OF HARMING ANYONE ELSE?: UNABLE TO ASSESS

## 2024-09-21 SDOH — SOCIAL STABILITY: SOCIAL INSECURITY: ARE THERE ANY APPARENT SIGNS OF INJURIES/BEHAVIORS THAT COULD BE RELATED TO ABUSE/NEGLECT?: UNABLE TO ASSESS

## 2024-09-21 SDOH — SOCIAL STABILITY: SOCIAL INSECURITY: HAVE YOU HAD THOUGHTS OF HARMING ANYONE ELSE?: UNABLE TO ASSESS

## 2024-09-21 SDOH — SOCIAL STABILITY: SOCIAL INSECURITY: WERE YOU ABLE TO COMPLETE ALL THE BEHAVIORAL HEALTH SCREENINGS?: NO

## 2024-09-21 SDOH — SOCIAL STABILITY: SOCIAL INSECURITY: ARE YOU OR HAVE YOU BEEN THREATENED OR ABUSED PHYSICALLY, EMOTIONALLY, OR SEXUALLY BY ANYONE?: UNABLE TO ASSESS

## 2024-09-21 SDOH — SOCIAL STABILITY: SOCIAL INSECURITY: HAS ANYONE EVER THREATENED TO HURT YOUR FAMILY OR YOUR PETS?: UNABLE TO ASSESS

## 2024-09-21 ASSESSMENT — ACTIVITIES OF DAILY LIVING (ADL)
DRESSING YOURSELF: NEEDS ASSISTANCE
HEARING - RIGHT EAR: FUNCTIONAL
BATHING: NEEDS ASSISTANCE
ADEQUATE_TO_COMPLETE_ADL: UNABLE TO ASSESS
FEEDING YOURSELF: INDEPENDENT
HEARING - LEFT EAR: FUNCTIONAL
PATIENT'S MEMORY ADEQUATE TO SAFELY COMPLETE DAILY ACTIVITIES?: UNABLE TO ASSESS
LACK_OF_TRANSPORTATION: PATIENT UNABLE TO ANSWER
WALKS IN HOME: NEEDS ASSISTANCE
JUDGMENT_ADEQUATE_SAFELY_COMPLETE_DAILY_ACTIVITIES: UNABLE TO ASSESS
JUDGMENT_ADEQUATE_SAFELY_COMPLETE_DAILY_ACTIVITIES: UNABLE TO ASSESS
ADEQUATE_TO_COMPLETE_ADL: UNABLE TO ASSESS
PATIENT'S MEMORY ADEQUATE TO SAFELY COMPLETE DAILY ACTIVITIES?: UNABLE TO ASSESS
TOILETING: NEEDS ASSISTANCE
GROOMING: NEEDS ASSISTANCE

## 2024-09-21 ASSESSMENT — ENCOUNTER SYMPTOMS
FEVER: 0
DIZZINESS: 0
NAUSEA: 0
PALPITATIONS: 0
HALLUCINATIONS: 0
CONSTIPATION: 0
SORE THROAT: 0
CHILLS: 0
SHORTNESS OF BREATH: 0
DYSURIA: 0
HEMATURIA: 0
MYALGIAS: 0
VOMITING: 0
CONFUSION: 0
ARTHRALGIAS: 0
COUGH: 0
DIARRHEA: 0
RHINORRHEA: 0
WOUND: 0

## 2024-09-21 ASSESSMENT — COGNITIVE AND FUNCTIONAL STATUS - GENERAL
DRESSING REGULAR UPPER BODY CLOTHING: A LITTLE
DAILY ACTIVITIY SCORE: 19
PERSONAL GROOMING: A LITTLE
DRESSING REGULAR LOWER BODY CLOTHING: A LITTLE
STANDING UP FROM CHAIR USING ARMS: A LITTLE
PATIENT BASELINE BEDBOUND: NO
WALKING IN HOSPITAL ROOM: A LITTLE
WALKING IN HOSPITAL ROOM: A LITTLE
TURNING FROM BACK TO SIDE WHILE IN FLAT BAD: A LITTLE
DAILY ACTIVITIY SCORE: 19
MOBILITY SCORE: 18
HELP NEEDED FOR BATHING: A LITTLE
TOILETING: A LITTLE
HELP NEEDED FOR BATHING: A LITTLE
PERSONAL GROOMING: A LITTLE
TURNING FROM BACK TO SIDE WHILE IN FLAT BAD: A LITTLE
STANDING UP FROM CHAIR USING ARMS: A LITTLE
MOBILITY SCORE: 18
MOVING FROM LYING ON BACK TO SITTING ON SIDE OF FLAT BED WITH BEDRAILS: A LITTLE
MOVING TO AND FROM BED TO CHAIR: A LITTLE
CLIMB 3 TO 5 STEPS WITH RAILING: A LITTLE
CLIMB 3 TO 5 STEPS WITH RAILING: A LITTLE
MOVING FROM LYING ON BACK TO SITTING ON SIDE OF FLAT BED WITH BEDRAILS: A LITTLE
DRESSING REGULAR LOWER BODY CLOTHING: A LITTLE
MOVING TO AND FROM BED TO CHAIR: A LITTLE
DRESSING REGULAR UPPER BODY CLOTHING: A LITTLE
TOILETING: A LITTLE

## 2024-09-21 ASSESSMENT — PAIN SCALES - GENERAL
PAINLEVEL_OUTOF10: 0 - NO PAIN
PAINLEVEL_OUTOF10: 0 - NO PAIN

## 2024-09-21 ASSESSMENT — LIFESTYLE VARIABLES
SKIP TO QUESTIONS 9-10: 0
AUDIT-C TOTAL SCORE: -1
HOW OFTEN DO YOU HAVE A DRINK CONTAINING ALCOHOL: PATIENT UNABLE TO ANSWER
HOW OFTEN DO YOU HAVE 6 OR MORE DRINKS ON ONE OCCASION: PATIENT UNABLE TO ANSWER
AUDIT-C TOTAL SCORE: -1
HOW MANY STANDARD DRINKS CONTAINING ALCOHOL DO YOU HAVE ON A TYPICAL DAY: PATIENT UNABLE TO ANSWER

## 2024-09-21 ASSESSMENT — PAIN - FUNCTIONAL ASSESSMENT: PAIN_FUNCTIONAL_ASSESSMENT: 0-10

## 2024-09-21 NOTE — CARE PLAN
Problem: Pain - Adult  Goal: Verbalizes/displays adequate comfort level or baseline comfort level  Outcome: Progressing     Problem: Safety - Adult  Goal: Free from fall injury  Outcome: Progressing     Problem: Chronic Conditions and Co-morbidities  Goal: Patient's chronic conditions and co-morbidity symptoms are monitored and maintained or improved  Outcome: Progressing     Problem: Fall/Injury  Goal: Not fall by end of shift  Outcome: Progressing     Problem: Discharge Planning  Goal: Discharge to home or other facility with appropriate resources  Outcome: Progressing

## 2024-09-21 NOTE — H&P
"History Of Present Illness  Timo Crawford is a 79 y.o. male with PMH dementia, HTN, HLD, CAD, dysphagia presented from his assisted living today with 1 minute of \"unresponsiveness\".  According to reports there may have been some shaking or loss of bladder during the episode.     Past Medical History  Past Medical History:   Diagnosis Date    Disease of stomach and duodenum, unspecified 05/15/2013    Gastric and duodenal disease    Elevated prostate specific antigen (PSA)     Elevated prostate specific antigen (PSA)    Other conditions influencing health status     Nephrolithiasis    Other hypoglycemia 07/17/2017    Fasting low blood sugar    Other infective otitis externa, bilateral 04/08/2016    Other infective acute otitis externa of both ears    Other noninfective acute otitis externa, right ear 12/01/2016    Other noninfective acute otitis externa of right ear    Personal history of other diseases of male genital organs     History of benign prostatic hypertrophy    Personal history of other diseases of the digestive system 08/12/2019    History of gastroesophageal reflux (GERD)    Personal history of other diseases of the nervous system and sense organs 06/04/2019    History of blepharitis    Personal history of other diseases of the nervous system and sense organs 06/04/2019    History of cataract    Personal history of other diseases of the nervous system and sense organs 11/14/2013    History of acute otitis externa    Personal history of other diseases of the nervous system and sense organs     History of acute conjunctivitis    Personal history of other diseases of the respiratory system 07/09/2014    History of asthma    Personal history of other diseases of the respiratory system     Personal history of asthma    Personal history of other specified conditions 01/11/2016    History of dizziness    Personal history of other specified conditions 01/09/2014    History of diarrhea    Torticollis 08/07/2018 "    Torticollis, acute       Surgical History  Past Surgical History:   Procedure Laterality Date    CATARACT EXTRACTION  07/09/2014    Cataract Surgery    CATARACT EXTRACTION  07/09/2014    Cataract Surgery    COLONOSCOPY  05/15/2013    Complete Colonoscopy    HERNIA REPAIR  04/10/2013    Inguinal Hernia Repair    OTHER SURGICAL HISTORY  08/12/2019    Knee arthroscopy    OTHER SURGICAL HISTORY  12/17/2018    Cataract surgery    OTHER SURGICAL HISTORY  12/17/2018    YAG laser capsulotomy    OTHER SURGICAL HISTORY  06/26/2018    Laser Suite Retina Treatment Consisted Of    SEPTOPLASTY  05/15/2013    Septoplasty        Social History  He reports that he has never smoked. He has never used smokeless tobacco. He reports that he does not currently use alcohol. He reports that he does not use drugs.    Family History  No family history on file.     Allergies  Azithromycin and Penicillins    Review of Systems   Reason unable to perform ROS: dementia.   Constitutional:  Negative for chills and fever.   HENT:  Negative for rhinorrhea and sore throat.    Respiratory:  Negative for cough and shortness of breath.    Cardiovascular:  Negative for chest pain and palpitations.   Gastrointestinal:  Negative for constipation, diarrhea, nausea and vomiting.   Genitourinary:  Negative for dysuria and hematuria.   Musculoskeletal:  Negative for arthralgias and myalgias.   Skin:  Negative for rash and wound.   Neurological:  Negative for dizziness and syncope.   Psychiatric/Behavioral:  Negative for confusion and hallucinations.         Physical Exam  Vitals reviewed.   Constitutional:       Appearance: Normal appearance.   HENT:      Head: Normocephalic and atraumatic.      Mouth/Throat:      Mouth: Mucous membranes are moist.   Eyes:      Conjunctiva/sclera: Conjunctivae normal.   Cardiovascular:      Rate and Rhythm: Normal rate.      Pulses: Normal pulses.   Pulmonary:      Effort: Pulmonary effort is normal.      Breath sounds:  "Normal breath sounds. No wheezing.   Abdominal:      General: Abdomen is flat. There is no distension.      Palpations: Abdomen is soft.      Tenderness: There is no guarding.   Musculoskeletal:         General: No swelling. Normal range of motion.   Skin:     General: Skin is warm and dry.   Neurological:      General: No focal deficit present.      Mental Status: He is alert. Mental status is at baseline.      Comments: Ao to self only  No focal neuro deficits  Moves all 4 extremities     Psychiatric:         Mood and Affect: Mood normal.         Behavior: Behavior normal.          Last Recorded Vitals  Blood pressure 121/74, pulse 59, resp. rate 13, height 1.803 m (5' 11\"), weight 75.8 kg (167 lb), SpO2 96%.    Relevant Results             Assessment/Plan   Assessment & Plan      Syncope vs seizure  Hypomagnesemia    CAD  HTN  HLD      Admit to tele, check echo, check orthostatic vitals  Neurology consult - known to them for dementia/ herpetic neuralgia (oxcarbazepine 150mg bid dose recently lowered)  IV fluids + home meds  Unlikely to get EEG during weekend, consider as outpatient  Lactic negative on arrival, so lower suspicion for seizure  Further imaging (MRI) per neuro recs - inpt vs outpatient  Med rec once verified   Confirm code status with POA  DVT prophy    Clarence Carr MD    "

## 2024-09-21 NOTE — PROGRESS NOTES
Pharmacy Medication History Review    Timo Crawford is a 79 y.o. male admitted for No Principal Problem: There is no principal problem currently on the Problem List. Please update the Problem List and refresh.. Pharmacy reviewed the patient's [id number] medications and allergies for accuracy.    The list below reflectives the updated PTA list. Please review each medication in order reconciliation for additional clarification and justification.  Prior to Admission medications    Medication Sig Start Date End Date Taking? Authorizing Provider   amitriptyline (Elavil) 10 mg tablet Take 1 tablet (10 mg) by mouth once daily at bedtime. 5/5/22  Yes Historical Provider, MD   atorvastatin (Lipitor) 80 mg tablet Take 1 tablet (80 mg) by mouth once daily at bedtime. 10/15/20  Yes Historical Provider, MD   Boost Breeze NutritionaL 0.04-1.05 gram-kcal/mL liquid Take 1 bottle by mouth 3 times daily (morning, midday, late afternoon). 9/10/24  Yes Historical Provider, MD   dorzolamide-timoloL (Cosopt) 22.3-6.8 mg/mL ophthalmic solution Administer 1 drop into the right eye 2 times a day. 8/2/24  Yes Historical Provider, MD   magnesium glycinate 100 mg magnesium capsule Take 1 capsule (100 mg) by mouth once daily. 9/11/24  Yes Historical Provider, MD   OcuSoft Lid Scrub Plus pads, medicated Apply 1 Pad topically 2 times a day. Apply to both eye lids 9/9/24  Yes Historical Provider, MD   Thera-GeL 0.5 % shampoo 1 Application by scalp route 2 times a week. As needed 10/26/23  Yes Historical Provider, MD   acetaminophen (Tylenol) 325 mg tablet Take 2 tablets (650 mg) by mouth every 6 hours if needed for mild pain (1 - 3) or fever (temp greater than 38.0 C). 7/3/23  Yes Historical Provider, MD   aspirin 81 mg chewable tablet Chew 1 tablet (81 mg) once daily. 2/2/24  Yes Historical Provider, MD   ceramides cleanser (CeraVe) cleanser cleanser Apply 1 Application topically if needed. three times weekly   Yes Historical Provider,  MD   emollient lotion Apply 1 Application topically 2 times a day as needed for dry skin. Whole body   Yes Historical Provider, MD   finasteride (Proscar) 5 mg tablet Take 1 tablet (5 mg) by mouth once daily. 4/1/13  Yes Historical Provider, MD   hydroCHLOROthiazide (HYDRODiuril) 25 mg tablet Take 1 tablet (25 mg) by mouth once daily. 6/15/23  Yes Historical Provider, MD   metoprolol succinate XL (Toprol-XL) 25 mg 24 hr tablet Take 1 tablet (25 mg) by mouth once daily. Hold for HR <60 10/15/20  Yes Historical Provider, MD   OXcarbazepine (Trileptal) 150 mg tablet Take 2 tablets (300 mg) by mouth 2 times a day. 8/29/24  Yes Historical Provider, MD   potassium chloride CR (Klor-Con M20) 20 mEq ER tablet Take 1 tablet (20 mEq) by mouth once daily.   Yes Historical Provider, MD   prednisoLONE acetate (Pred-Forte) 1 % ophthalmic suspension Administer 1 drop into the right eye. 1/8/24  Not taking per custodial papers Historical Provider, MD        The list below reflectives the updated allergy list. Please review each documented allergy for additional clarification and justification.  Allergies  Reviewed by Madalyn Maynard on 9/21/2024        Severity Reactions Comments    Azithromycin Medium Hives     Penicillins Medium Hives             Below are additional concerns with the patient's PTA list.    Medication list from [facility], printed 09/21/2024 8971.    Madalyn Maynard

## 2024-09-21 NOTE — ED PROVIDER NOTES
Limitations to History: Dementia  External Records Reviewed: Previous hospital records when he was diagnosed with viral encephalitis in 2022, nursing home records  Independent Historians: EMS, nurse aid    HPI  Timo Crawford is a 79 y.o. male with a history of hypertension, coronary artery disease, GERD, dementia presenting to the emergency department concern for unresponsiveness.  He was walking outside when he went to sit on a bench.  He was found unresponsive and was reportedly having generalized shaking for 1 minute.  Nurse reports urinary incontinence.  The patient states that he felt dizzy prior to this episode.  He currently feels normal without any chest pain, shortness of breath, or focal neurologic deficits.  He reports that he is been eating and drinking well.    [facility]  Past Medical History:   Diagnosis Date    Disease of stomach and duodenum, unspecified 05/15/2013    Gastric and duodenal disease    Elevated prostate specific antigen (PSA)     Elevated prostate specific antigen (PSA)    Other conditions influencing health status     Nephrolithiasis    Other hypoglycemia 07/17/2017    Fasting low blood sugar    Other infective otitis externa, bilateral 04/08/2016    Other infective acute otitis externa of both ears    Other noninfective acute otitis externa, right ear 12/01/2016    Other noninfective acute otitis externa of right ear    Personal history of other diseases of male genital organs     History of benign prostatic hypertrophy    Personal history of other diseases of the digestive system 08/12/2019    History of gastroesophageal reflux (GERD)    Personal history of other diseases of the nervous system and sense organs 06/04/2019    History of blepharitis    Personal history of other diseases of the nervous system and sense organs 06/04/2019    History of cataract    Personal history of other diseases of the nervous system and sense organs 11/14/2013    History of acute otitis externa    Personal  history of other diseases of the nervous system and sense organs     History of acute conjunctivitis    Personal history of other diseases of the respiratory system 07/09/2014    History of asthma    Personal history of other diseases of the respiratory system     Personal history of asthma    Personal history of other specified conditions 01/11/2016    History of dizziness    Personal history of other specified conditions 01/09/2014    History of diarrhea    Torticollis 08/07/2018    Torticollis, acute       Meds  Current Outpatient Medications   Medication Instructions    acetaminophen (TYLENOL) 325 mg, oral    amitriptyline (ELAVIL) 10 mg, oral, Every 24 hours    aspirin 81 mg, oral, Daily    atorvastatin (LIPITOR) 80 mg, oral, Daily    finasteride (PROSCAR) 5 mg, oral, Every 24 hours    hydroCHLOROthiazide (HYDRODIURIL) 25 mg, oral, 2 times daily    metoprolol succinate XL (TOPROL-XL) 25 mg, oral, Daily    OXcarbazepine (TRILEPTAL) 300 mg, oral, 2 times daily    POTASSIUM CHLORIDE ORAL 20 mEq, oral    prednisoLONE acetate (Pred-Forte) 1 % ophthalmic suspension 1 drop, Right Eye       Allergies  Allergies   Allergen Reactions    Azithromycin Hives    Penicillins Hives        SHx  Social History     Tobacco Use    Smoking status: Never    Smokeless tobacco: Never   Vaping Use    Vaping status: Never Used   Substance Use Topics    Alcohol use: Not Currently    Drug use: Never       ------------------------------------------------------------------------------------------------------------------------------------------    There were no vitals taken for this visit.    Physical Exam  Vitals and nursing note reviewed.   Constitutional:       General: He is not in acute distress.     Appearance: Normal appearance.   HENT:      Head: Normocephalic and atraumatic.      Right Ear: External ear normal.      Left Ear: External ear normal.   Eyes:      Extraocular Movements: Extraocular movements intact.       Conjunctiva/sclera: Conjunctivae normal.   Cardiovascular:      Rate and Rhythm: Normal rate and regular rhythm.      Pulses: Normal pulses.      Heart sounds: Normal heart sounds. No murmur heard.     No friction rub. No gallop.   Pulmonary:      Effort: Pulmonary effort is normal. No respiratory distress.      Breath sounds: No wheezing, rhonchi or rales.   Abdominal:      General: There is no distension.      Palpations: Abdomen is soft.      Tenderness: There is no abdominal tenderness. There is no guarding or rebound.   Musculoskeletal:         General: No swelling. Normal range of motion.      Cervical back: Neck supple.      Right lower leg: No edema.      Left lower leg: No edema.   Skin:     General: Skin is warm and dry.   Neurological:      General: No focal deficit present.      Mental Status: He is alert. He is disoriented.      Cranial Nerves: Cranial nerve deficit: at baseline.      Comments: Cranial nerves II through XII intact  Strength 5 out of 5 throughout  Sensation intact throughout   Psychiatric:         Mood and Affect: Mood normal.          ------------------------------------------------------------------------------------------------------------------------------------------    Medical Decision Making: This is an 79-year-old male presenting to the emergency department with concern for syncope versus seizure.  Vital signs are normal and stable except for some mild bradycardia upon arrival to the ED that has since resolved.  On examination he has a normal neurologic exam.  At this time, I am most concerned for syncope.  He did have urinary incontinence during the episode which could be concerning for either etiology.  Despite his normal neurologic exam, I did order a CT scan of his head which revealed no evidence of intracranial hemorrhage or mass although he may benefit from an MRI.  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  I have low concern for acute coronary syndrome given his troponins are 5 and 6.  His lactate is normal which makes me less concern for seizure disorder.  His magnesium is mildly low at 1.59 so was repleted with 2 g of magnesium.  Otherwise his electrolytes are within normal limits have low concern for hypo or hypernatremia contributing to his symptoms.  Additionally, his CBC rules out symptomatic anemia.  Chest x-ray is clear without any focal consolidation.  I discussed the patient's case with his wife who did agree with admission to the hospital for continued evaluation management.  His case was discussed with the on-call hospitalist who accepted for admission to the hospital.    Independent Interpretations: CT head shows no intracranial abnormality, chest x-ray reveals no evidence of intrathoracic abnormality    EKG interpreted by me:  ED Course as of 09/21/24 1504   Sat Sep 21, 2024   1232 EKG:  Rate 60  NSR  Left axis deviation  LAFB, RBBB    No ischemic changes [AG]      ED Course User Index  [AG] Ramiro Alba MD         Diagnoses as of 09/21/24 1504   Unresponsive episode   Bradycardia       Discussed with: Hospitalist      Ramiro Alba MD  Emergency Medicine Attending       Ramiro Alba MD  09/21/24 1507

## 2024-09-22 VITALS
OXYGEN SATURATION: 95 % | BODY MASS INDEX: 23.38 KG/M2 | WEIGHT: 167 LBS | DIASTOLIC BLOOD PRESSURE: 63 MMHG | SYSTOLIC BLOOD PRESSURE: 123 MMHG | TEMPERATURE: 97 F | HEART RATE: 61 BPM | RESPIRATION RATE: 18 BRPM | HEIGHT: 71 IN

## 2024-09-22 PROBLEM — R56.9 SEIZURE (MULTI): Status: ACTIVE | Noted: 2024-09-22

## 2024-09-22 LAB
ANION GAP SERPL CALC-SCNC: 9 MMOL/L (ref 10–20)
BUN SERPL-MCNC: 18 MG/DL (ref 6–23)
CALCIUM SERPL-MCNC: 8.8 MG/DL (ref 8.6–10.3)
CHLORIDE SERPL-SCNC: 100 MMOL/L (ref 98–107)
CO2 SERPL-SCNC: 30 MMOL/L (ref 21–32)
CREAT SERPL-MCNC: 0.78 MG/DL (ref 0.5–1.3)
EGFRCR SERPLBLD CKD-EPI 2021: 90 ML/MIN/1.73M*2
ERYTHROCYTE [DISTWIDTH] IN BLOOD BY AUTOMATED COUNT: 13.6 % (ref 11.5–14.5)
GLUCOSE SERPL-MCNC: 87 MG/DL (ref 74–99)
HCT VFR BLD AUTO: 35.1 % (ref 41–52)
HGB BLD-MCNC: 12.2 G/DL (ref 13.5–17.5)
MAGNESIUM SERPL-MCNC: 1.75 MG/DL (ref 1.6–2.4)
MCH RBC QN AUTO: 32.4 PG (ref 26–34)
MCHC RBC AUTO-ENTMCNC: 34.8 G/DL (ref 32–36)
MCV RBC AUTO: 93 FL (ref 80–100)
NRBC BLD-RTO: 0 /100 WBCS (ref 0–0)
PLATELET # BLD AUTO: 265 X10*3/UL (ref 150–450)
POTASSIUM SERPL-SCNC: 3.2 MMOL/L (ref 3.5–5.3)
RBC # BLD AUTO: 3.77 X10*6/UL (ref 4.5–5.9)
SODIUM SERPL-SCNC: 136 MMOL/L (ref 136–145)
WBC # BLD AUTO: 6.6 X10*3/UL (ref 4.4–11.3)

## 2024-09-22 PROCEDURE — 36415 COLL VENOUS BLD VENIPUNCTURE: CPT | Performed by: INTERNAL MEDICINE

## 2024-09-22 PROCEDURE — 97162 PT EVAL MOD COMPLEX 30 MIN: CPT | Mod: GP

## 2024-09-22 PROCEDURE — 85027 COMPLETE CBC AUTOMATED: CPT | Performed by: INTERNAL MEDICINE

## 2024-09-22 PROCEDURE — 2500000004 HC RX 250 GENERAL PHARMACY W/ HCPCS (ALT 636 FOR OP/ED): Performed by: INTERNAL MEDICINE

## 2024-09-22 PROCEDURE — 2500000002 HC RX 250 W HCPCS SELF ADMINISTERED DRUGS (ALT 637 FOR MEDICARE OP, ALT 636 FOR OP/ED): Performed by: INTERNAL MEDICINE

## 2024-09-22 PROCEDURE — 83735 ASSAY OF MAGNESIUM: CPT | Performed by: INTERNAL MEDICINE

## 2024-09-22 PROCEDURE — 97535 SELF CARE MNGMENT TRAINING: CPT | Mod: GO

## 2024-09-22 PROCEDURE — 99223 1ST HOSP IP/OBS HIGH 75: CPT | Performed by: PSYCHIATRY & NEUROLOGY

## 2024-09-22 PROCEDURE — G0378 HOSPITAL OBSERVATION PER HR: HCPCS

## 2024-09-22 PROCEDURE — 97165 OT EVAL LOW COMPLEX 30 MIN: CPT | Mod: GO

## 2024-09-22 PROCEDURE — 96372 THER/PROPH/DIAG INJ SC/IM: CPT | Performed by: INTERNAL MEDICINE

## 2024-09-22 PROCEDURE — 99232 SBSQ HOSP IP/OBS MODERATE 35: CPT | Performed by: INTERNAL MEDICINE

## 2024-09-22 PROCEDURE — 80048 BASIC METABOLIC PNL TOTAL CA: CPT | Performed by: INTERNAL MEDICINE

## 2024-09-22 PROCEDURE — 2500000002 HC RX 250 W HCPCS SELF ADMINISTERED DRUGS (ALT 637 FOR MEDICARE OP, ALT 636 FOR OP/ED): Performed by: PSYCHIATRY & NEUROLOGY

## 2024-09-22 PROCEDURE — 2500000001 HC RX 250 WO HCPCS SELF ADMINISTERED DRUGS (ALT 637 FOR MEDICARE OP): Performed by: INTERNAL MEDICINE

## 2024-09-22 RX ORDER — POTASSIUM CHLORIDE 20 MEQ/1
40 TABLET, EXTENDED RELEASE ORAL ONCE
Status: COMPLETED | OUTPATIENT
Start: 2024-09-22 | End: 2024-09-22

## 2024-09-22 RX ORDER — OXCARBAZEPINE 150 MG/1
600 TABLET, FILM COATED ORAL 2 TIMES DAILY
Status: DISCONTINUED | OUTPATIENT
Start: 2024-09-22 | End: 2024-09-25 | Stop reason: HOSPADM

## 2024-09-22 RX ADMIN — ENOXAPARIN SODIUM 40 MG: 40 INJECTION SUBCUTANEOUS at 16:26

## 2024-09-22 RX ADMIN — OXCARBAZEPINE 150 MG: 150 TABLET, FILM COATED ORAL at 10:13

## 2024-09-22 RX ADMIN — DORZOLAMIDE HYDROCHLORIDE TIMOLOL MALEATE 1 DROP: 20; 5 SOLUTION/ DROPS OPHTHALMIC at 22:47

## 2024-09-22 RX ADMIN — FINASTERIDE 5 MG: 5 TABLET, FILM COATED ORAL at 08:33

## 2024-09-22 RX ADMIN — OXCARBAZEPINE 600 MG: 150 TABLET, FILM COATED ORAL at 22:47

## 2024-09-22 RX ADMIN — POTASSIUM CHLORIDE 40 MEQ: 1500 TABLET, EXTENDED RELEASE ORAL at 10:13

## 2024-09-22 RX ADMIN — HYDROCHLOROTHIAZIDE 25 MG: 25 TABLET ORAL at 08:33

## 2024-09-22 RX ADMIN — METOPROLOL SUCCINATE 25 MG: 25 TABLET, EXTENDED RELEASE ORAL at 08:33

## 2024-09-22 RX ADMIN — ATORVASTATIN CALCIUM 80 MG: 80 TABLET, FILM COATED ORAL at 22:47

## 2024-09-22 RX ADMIN — DORZOLAMIDE HYDROCHLORIDE TIMOLOL MALEATE 1 DROP: 20; 5 SOLUTION/ DROPS OPHTHALMIC at 08:33

## 2024-09-22 RX ADMIN — AMITRIPTYLINE HYDROCHLORIDE 10 MG: 10 TABLET, FILM COATED ORAL at 22:47

## 2024-09-22 RX ADMIN — ASPIRIN 81 MG CHEWABLE TABLET 81 MG: 81 TABLET CHEWABLE at 08:33

## 2024-09-22 ASSESSMENT — COGNITIVE AND FUNCTIONAL STATUS - GENERAL
TURNING FROM BACK TO SIDE WHILE IN FLAT BAD: A LITTLE
DRESSING REGULAR LOWER BODY CLOTHING: A LITTLE
CLIMB 3 TO 5 STEPS WITH RAILING: A LITTLE
HELP NEEDED FOR BATHING: A LITTLE
DRESSING REGULAR UPPER BODY CLOTHING: A LITTLE
HELP NEEDED FOR BATHING: A LITTLE
MOVING FROM LYING ON BACK TO SITTING ON SIDE OF FLAT BED WITH BEDRAILS: A LITTLE
CLIMB 3 TO 5 STEPS WITH RAILING: A LITTLE
MOVING TO AND FROM BED TO CHAIR: A LITTLE
PERSONAL GROOMING: A LITTLE
TOILETING: A LITTLE
DAILY ACTIVITIY SCORE: 19
MOBILITY SCORE: 18
WALKING IN HOSPITAL ROOM: A LITTLE
TOILETING: A LITTLE
DAILY ACTIVITIY SCORE: 19
DRESSING REGULAR LOWER BODY CLOTHING: A LITTLE
PERSONAL GROOMING: A LITTLE
WALKING IN HOSPITAL ROOM: A LITTLE
MOBILITY SCORE: 20
STANDING UP FROM CHAIR USING ARMS: A LITTLE
STANDING UP FROM CHAIR USING ARMS: A LITTLE
DRESSING REGULAR UPPER BODY CLOTHING: A LITTLE
MOVING TO AND FROM BED TO CHAIR: A LITTLE

## 2024-09-22 ASSESSMENT — PAIN SCALES - GENERAL
PAINLEVEL_OUTOF10: 0 - NO PAIN
PAINLEVEL_OUTOF10: 0 - NO PAIN

## 2024-09-22 ASSESSMENT — ENCOUNTER SYMPTOMS: SEIZURES: 1

## 2024-09-22 ASSESSMENT — ACTIVITIES OF DAILY LIVING (ADL): HOME_MANAGEMENT_TIME_ENTRY: 12

## 2024-09-22 NOTE — CARE PLAN
The patient's goals for the shift include  sleep. Comfort and safety    The clinical goals for the shift include patient will remain safe and free from injury or falls for entire shift.

## 2024-09-22 NOTE — CONSULTS
Inpatient consult to Neurology  Consult performed by: Chiki Momin MD  Consult ordered by: Clarence Carr MD          History Of Present Illness  Timo Crawford is a 79 y.o. male presenting with a a loss of consciousness.  The patient apparently was walking outside when he went to sit on the bench.  He was found unresponsive and reportedly was having generalized shaking for 1 minute.  The nurse reported some urinary incontinence.  The patient states that he felt dizzy prior to the episode.  The patient was taken to [place] [facility] where he had a CT scan of the brain done that was negative for any acute process.  Currently the patient feels back to his baseline denies any headache, nausea, vomiting, facial or extremity weakness or numbness.  The patient is currently on oxcarbazepine for postherpetic neuralgia.    Past Medical History  Past Medical History:   Diagnosis Date    Disease of stomach and duodenum, unspecified 05/15/2013    Gastric and duodenal disease    Elevated prostate specific antigen (PSA)     Elevated prostate specific antigen (PSA)    Other conditions influencing health status     Nephrolithiasis    Other hypoglycemia 07/17/2017    Fasting low blood sugar    Other infective otitis externa, bilateral 04/08/2016    Other infective acute otitis externa of both ears    Other noninfective acute otitis externa, right ear 12/01/2016    Other noninfective acute otitis externa of right ear    Personal history of other diseases of male genital organs     History of benign prostatic hypertrophy    Personal history of other diseases of the digestive system 08/12/2019    History of gastroesophageal reflux (GERD)    Personal history of other diseases of the nervous system and sense organs 06/04/2019    History of blepharitis    Personal history of other diseases of the nervous system and sense organs 06/04/2019    History of cataract    Personal history of other diseases of the nervous system and sense  organs 11/14/2013    History of acute otitis externa    Personal history of other diseases of the nervous system and sense organs     History of acute conjunctivitis    Personal history of other diseases of the respiratory system 07/09/2014    History of asthma    Personal history of other diseases of the respiratory system     Personal history of asthma    Personal history of other specified conditions 01/11/2016    History of dizziness    Personal history of other specified conditions 01/09/2014    History of diarrhea    Torticollis 08/07/2018    Torticollis, acute     Surgical History  Past Surgical History:   Procedure Laterality Date    CATARACT EXTRACTION  07/09/2014    Cataract Surgery    CATARACT EXTRACTION  07/09/2014    Cataract Surgery    COLONOSCOPY  05/15/2013    Complete Colonoscopy    HERNIA REPAIR  04/10/2013    Inguinal Hernia Repair    OTHER SURGICAL HISTORY  08/12/2019    Knee arthroscopy    OTHER SURGICAL HISTORY  12/17/2018    Cataract surgery    OTHER SURGICAL HISTORY  12/17/2018    YAG laser capsulotomy    OTHER SURGICAL HISTORY  06/26/2018    Laser Suite Retina Treatment Consisted Of    SEPTOPLASTY  05/15/2013    Septoplasty     Social History  Social History     Tobacco Use    Smoking status: Never    Smokeless tobacco: Never   Vaping Use    Vaping status: Never Used   Substance Use Topics    Alcohol use: Not Currently    Drug use: Never     Allergies  Azithromycin and Penicillins  Medications Prior to Admission   Medication Sig Dispense Refill Last Dose    amitriptyline (Elavil) 10 mg tablet Take 1 tablet (10 mg) by mouth once daily at bedtime.   9/20/2024    atorvastatin (Lipitor) 80 mg tablet Take 1 tablet (80 mg) by mouth once daily at bedtime.   9/20/2024    Boost Breeze NutritionaL 0.04-1.05 gram-kcal/mL liquid Take 1 bottle by mouth 3 times daily (morning, midday, late afternoon).   Unknown    dorzolamide-timoloL (Cosopt) 22.3-6.8 mg/mL ophthalmic solution Administer 1 drop into the  "right eye 2 times a day.   Unknown    magnesium glycinate 100 mg magnesium capsule Take 1 capsule (100 mg) by mouth once daily.   Unknown    OcuSoft Lid Scrub Plus pads, medicated Apply 1 Pad topically 2 times a day. Apply to both eye lids   Unknown    Thera-GeL 0.5 % shampoo 1 Application by scalp route 2 times a week. As needed   Unknown    acetaminophen (Tylenol) 325 mg tablet Take 2 tablets (650 mg) by mouth every 6 hours if needed for mild pain (1 - 3) or fever (temp greater than 38.0 C).   Unknown    aspirin 81 mg chewable tablet Chew 1 tablet (81 mg) once daily.   Unknown    ceramides cleanser (CeraVe) cleanser cleanser Apply 1 Application topically if needed. three times weekly   Unknown    emollient lotion Apply 1 Application topically 2 times a day as needed for dry skin. Whole body   Unknown    finasteride (Proscar) 5 mg tablet Take 1 tablet (5 mg) by mouth once daily.   Unknown    hydroCHLOROthiazide (HYDRODiuril) 25 mg tablet Take 1 tablet (25 mg) by mouth once daily.   Unknown    metoprolol succinate XL (Toprol-XL) 25 mg 24 hr tablet Take 1 tablet (25 mg) by mouth once daily. Hold for HR <60   Unknown    OXcarbazepine (Trileptal) 150 mg tablet Take 2 tablets (300 mg) by mouth 2 times a day.   Unknown    potassium chloride CR (Klor-Con M20) 20 mEq ER tablet Take 1 tablet (20 mEq) by mouth once daily.   Unknown    prednisoLONE acetate (Pred-Forte) 1 % ophthalmic suspension Administer 1 drop into the right eye.        Family history  The patient's family history was reviewed and is noncontributory.    Review of Systems   Neurological:  Positive for seizures.   All other systems reviewed and are negative.      Neurological Exam  Physical Exam  Last Recorded Vitals  Blood pressure 124/59, pulse 60, temperature 36.2 °C (97.2 °F), resp. rate 14, height 1.803 m (5' 11\"), weight 75.8 kg (167 lb), SpO2 96%.    The patient is a well developed, [well-nourished] [male] in no acute distress.    The patient's " funduscopic examination shows no papilledema bilaterally.    The patient's extremity examination shows that the pulses are 2+ in the upper and lower extremities bilaterally and there is no edema in the lower extremities bilaterally.    The patient's mental status testing is alert and oriented ×2 with moderate confusion but with no evidence of aphasia or dysarthria.  The patient's memory testing, fund of knowledge and concentration are all poor.    Mildly widely based and mildly unsteady.  The patient's cranial nerves 2, 3, 4, 5, 6, 7, 8, 9, 10, 11 and 12 are all within normal limits.  The patient's motor testing shows normal tone, bulk, and power in the upper and lower extremities bilaterally.  The patient's sensory testing is intact to light touch in the upper and lower extremities bilaterally.  The patient's cerebellar testing is intact in the upper and lower extremities bilaterally.  The patient's station and gait are normal.  The patient's reflexes are 1+ in the upper and lower extremities and symmetrical.    Relevant Results  Scheduled medications  amitriptyline, 10 mg, oral, Nightly  aspirin, 81 mg, oral, Daily  atorvastatin, 80 mg, oral, Nightly  dorzolamide-timoloL, 1 drop, Right Eye, BID  enoxaparin, 40 mg, subcutaneous, q24h  finasteride, 5 mg, oral, Daily  hydroCHLOROthiazide, 25 mg, oral, Daily  metoprolol succinate XL, 25 mg, oral, Daily  OXcarbazepine, 150 mg, oral, BID  perflutren lipid microspheres, 0.5-10 mL of dilution, intravenous, Once in imaging  perflutren protein A microsphere, 0.5 mL, intravenous, Once in imaging  sulfur hexafluoride microsphr, 2 mL, intravenous, Once in imaging      Continuous medications  lactated Ringer's, 100 mL/hr, Last Rate: 100 mL/hr (09/22/24 0600)      PRN medications  PRN medications: acetaminophen **OR** acetaminophen **OR** acetaminophen, benzocaine-menthol, melatonin, polyethylene glycol    Results for orders placed or performed during the hospital encounter of  09/21/24 (from the past 96 hour(s))   POCT GLUCOSE   Result Value Ref Range    POCT Glucose 108 (H) 74 - 99 mg/dL   CBC and Auto Differential   Result Value Ref Range    WBC 8.3 4.4 - 11.3 x10*3/uL    nRBC 0.0 0.0 - 0.0 /100 WBCs    RBC 4.23 (L) 4.50 - 5.90 x10*6/uL    Hemoglobin 13.6 13.5 - 17.5 g/dL    Hematocrit 39.5 (L) 41.0 - 52.0 %    MCV 93 80 - 100 fL    MCH 32.2 26.0 - 34.0 pg    MCHC 34.4 32.0 - 36.0 g/dL    RDW 13.7 11.5 - 14.5 %    Platelets 337 150 - 450 x10*3/uL    Neutrophils % 68.2 40.0 - 80.0 %    Immature Granulocytes %, Automated 0.5 0.0 - 0.9 %    Lymphocytes % 19.9 13.0 - 44.0 %    Monocytes % 8.6 2.0 - 10.0 %    Eosinophils % 2.3 0.0 - 6.0 %    Basophils % 0.5 0.0 - 2.0 %    Neutrophils Absolute 5.69 (H) 1.60 - 5.50 x10*3/uL    Immature Granulocytes Absolute, Automated 0.04 0.00 - 0.50 x10*3/uL    Lymphocytes Absolute 1.66 0.80 - 3.00 x10*3/uL    Monocytes Absolute 0.72 0.05 - 0.80 x10*3/uL    Eosinophils Absolute 0.19 0.00 - 0.40 x10*3/uL    Basophils Absolute 0.04 0.00 - 0.10 x10*3/uL   Basic metabolic panel   Result Value Ref Range    Glucose 112 (H) 74 - 99 mg/dL    Sodium 136 136 - 145 mmol/L    Potassium 3.6 3.5 - 5.3 mmol/L    Chloride 101 98 - 107 mmol/L    Bicarbonate 27 21 - 32 mmol/L    Anion Gap 12 10 - 20 mmol/L    Urea Nitrogen 20 6 - 23 mg/dL    Creatinine 0.95 0.50 - 1.30 mg/dL    eGFR 81 >60 mL/min/1.73m*2    Calcium 9.4 8.6 - 10.3 mg/dL   Magnesium   Result Value Ref Range    Magnesium 1.59 (L) 1.60 - 2.40 mg/dL   Hepatic function panel   Result Value Ref Range    Albumin 3.9 3.4 - 5.0 g/dL    Bilirubin, Total 0.7 0.0 - 1.2 mg/dL    Bilirubin, Direct 0.1 0.0 - 0.3 mg/dL    Alkaline Phosphatase 69 33 - 136 U/L    ALT 17 10 - 52 U/L    AST 21 9 - 39 U/L    Total Protein 6.5 6.4 - 8.2 g/dL   B-Type Natriuretic Peptide   Result Value Ref Range    BNP 46 0 - 99 pg/mL   Troponin I, High Sensitivity, Initial   Result Value Ref Range    Troponin I, High Sensitivity 5 0 - 20 ng/L    Blood Gas Venous Full Panel   Result Value Ref Range    POCT pH, Venous 7.41 7.33 - 7.43 pH    POCT pCO2, Venous 42 41 - 51 mm Hg    POCT pO2, Venous 38 35 - 45 mm Hg    POCT SO2, Venous 59 45 - 75 %    POCT Oxy Hemoglobin, Venous 57.1 45.0 - 75.0 %    POCT Hematocrit Calculated, Venous 38.0 (L) 41.0 - 52.0 %    POCT Sodium, Venous 135 (L) 136 - 145 mmol/L    POCT Potassium, Venous 3.3 (L) 3.5 - 5.3 mmol/L    POCT Chloride, Venous 104 98 - 107 mmol/L    POCT Ionized Calicum, Venous 1.17 1.10 - 1.33 mmol/L    POCT Glucose, Venous 94 74 - 99 mg/dL    POCT Lactate, Venous 0.9 0.4 - 2.0 mmol/L    POCT Base Excess, Venous 1.7 -2.0 - 3.0 mmol/L    POCT HCO3 Calculated, Venous 26.6 (H) 22.0 - 26.0 mmol/L    POCT Hemoglobin, Venous 12.6 (L) 13.5 - 17.5 g/dL    POCT Anion Gap, Venous 8.0 (L) 10.0 - 25.0 mmol/L    Patient Temperature 37.0 degrees Celsius    FiO2 21 %   Troponin, High Sensitivity, 1 Hour   Result Value Ref Range    Troponin I, High Sensitivity 6 0 - 20 ng/L   CBC   Result Value Ref Range    WBC 6.6 4.4 - 11.3 x10*3/uL    nRBC 0.0 0.0 - 0.0 /100 WBCs    RBC 3.77 (L) 4.50 - 5.90 x10*6/uL    Hemoglobin 12.2 (L) 13.5 - 17.5 g/dL    Hematocrit 35.1 (L) 41.0 - 52.0 %    MCV 93 80 - 100 fL    MCH 32.4 26.0 - 34.0 pg    MCHC 34.8 32.0 - 36.0 g/dL    RDW 13.6 11.5 - 14.5 %    Platelets 265 150 - 450 x10*3/uL   Basic metabolic panel   Result Value Ref Range    Glucose 87 74 - 99 mg/dL    Sodium 136 136 - 145 mmol/L    Potassium 3.2 (L) 3.5 - 5.3 mmol/L    Chloride 100 98 - 107 mmol/L    Bicarbonate 30 21 - 32 mmol/L    Anion Gap 9 (L) 10 - 20 mmol/L    Urea Nitrogen 18 6 - 23 mg/dL    Creatinine 0.78 0.50 - 1.30 mg/dL    eGFR 90 >60 mL/min/1.73m*2    Calcium 8.8 8.6 - 10.3 mg/dL   Magnesium   Result Value Ref Range    Magnesium 1.75 1.60 - 2.40 mg/dL            I have personally reviewed the following imaging results CT head wo IV contrast    Result Date: 9/21/2024  Interpreted By:  Marck Henry, STUDY: CT HEAD WO  IV CONTRAST;  9/21/2024 1:08 pm   INDICATION: Signs/Symptoms:syncope vs seizure.   COMPARISON: CT head 02/06/2024.   ACCESSION NUMBER(S): [id number]   ORDERING CLINICIAN: DAVINA JOLLEY   TECHNIQUE: Noncontrast axial CT scan of head was performed.   FINDINGS: Parenchyma: No intracranial hemorrhage. The grey-white differentiation is intact. No mass effect or midline shift. Scattered periventricular white matter hypodensities, likely chronic microvascular ischemic change.   CSF Spaces: The ventricles, sulci and basal cisterns are within normal limits for age.   Extra-Axial Fluid: None.   Calvarium: No acute depressed fracture.   Paranasal sinuses: Visualized paranasal sinuses are clear.   Mastoids: Clear.   Orbits: No acute abnormality.   Soft tissues: No acute abnormality.       No acute intracranial hemorrhage or depressed calvarial fracture. Consider MRI for evaluation of possible occult seizure focus as warranted.   MACRO None   Signed by: Marck Henry 9/21/2024 2:13 PM Dictation workstation:   [id number]    XR chest 2 views    Result Date: 9/21/2024  Interpreted By:  Maru Garcia, STUDY: XR CHEST 2 VIEWS 9/21/2024 1:28 pm   INDICATION: Syncope vs seizure versus seizure   COMPARISON: 05/13/2022   ACCESSION NUMBER(S): [id number]   ORDERING CLINICIAN: DAVINA JOLLEY   TECHNIQUE: PA and lateral views of the chest were acquired.   FINDINGS: Cardiac size is normal with calcified plaque visible within the aortic knob. There is a stable calcified granuloma within the right upper lobe with lungs otherwise clear. No pleural abnormality is seen.   There are degenerative changes of the thoracic spine.       No acute cardiopulmonary disease.   Signed by: Maru Garcia 9/21/2024 2:12 PM Dictation workstation:   [id number]       Assessment/Plan   Assessment & Plan  Unresponsive episode    Seizure (Multi)    Impression: The patient is an 79-year-old male with a history of dementia and multiple medical problems who had an  episode of loss of consciousness with seizure activity.  His neurological examination is abnormal and noted above.  The differential diagnosis includes seizure, TIA, cerebral infarction and cardiac arrhythmia.      Plan: The patient needs an MRI of the brain without contrast as well as an MRA of the neck and brain.  The patient needs an EEG as well as an orthostatic blood pressure and heart rate checked.  The patient needs an echocardiogram and a Zio patch for 2 weeks.  I will increase his oxcarbazepine to 600 mg twice a day.  I would certainly continue his aspirin and atorvastatin.  The patient needs to continue stroke risk factor modification.   The patient needs a PT, OT, social service, rehab and speech therapy consult.  The patient needs DVT prophylaxis.  The patient needs neurochecks as per protocol.  I will send the note to Dr. Carr.  Thank you very much for sending me this very interesting consultation.  I discussed all these issues in detail with the patient and answered all their questions.  I will continue to follow the patient while they are in the hospital.  The patient needs follow-up with their primary care doctor within 2 weeks of discharge.  On discharge, the patient will follow up with Dr. Anne Mera in the office within 3 months.          Chiki Momin MD

## 2024-09-22 NOTE — NURSING NOTE
Attempted to call patient's wife to complete MRI screening form, phone number listed is disconnected. Attempted patient's listed home phone number but it seemed to be some corporate work number. Left message with contact Prashant, requesting a call back.     [phone] Spoke to Radha at [facility]. Radha said she would give patient's wife, Chago, the number to the 3rd floor so staff can complete MRI screening form.

## 2024-09-22 NOTE — PROGRESS NOTES
Timo Crawford is a 79 y.o. male on day 0 of admission presenting with Unresponsive episode.      Subjective   Up in bed.  No complaints.        Objective     Last Recorded Vitals  /59 (Patient Position: Standing) Comment: orthos  Pulse 73   Temp 36.9 °C (98.4 °F) (Temporal)   Resp 18   Wt 75.8 kg (167 lb)   SpO2 95%   Intake/Output last 3 Shifts:    Intake/Output Summary (Last 24 hours) at 9/22/2024 1447  Last data filed at 9/22/2024 1129  Gross per 24 hour   Intake 1764.17 ml   Output --   Net 1764.17 ml       Admission Weight  Weight: 75.8 kg (167 lb) (09/21/24 1302)    Daily Weight  09/21/24 : 75.8 kg (167 lb)    Image Results  CT head wo IV contrast  Narrative: Interpreted By:  Marck Henry,   STUDY:  CT HEAD WO IV CONTRAST;  9/21/2024 1:08 pm      INDICATION:  Signs/Symptoms:syncope vs seizure.      COMPARISON:  CT head 02/06/2024.      ACCESSION NUMBER(S):  [id number]      ORDERING CLINICIAN:  DAVINA JOLLEY      TECHNIQUE:  Noncontrast axial CT scan of head was performed.      FINDINGS:  Parenchyma: No intracranial hemorrhage. The grey-white  differentiation is intact. No mass effect or midline shift. Scattered  periventricular white matter hypodensities, likely chronic  microvascular ischemic change.      CSF Spaces: The ventricles, sulci and basal cisterns are within  normal limits for age.      Extra-Axial Fluid: None.      Calvarium: No acute depressed fracture.      Paranasal sinuses: Visualized paranasal sinuses are clear.      Mastoids: Clear.      Orbits: No acute abnormality.      Soft tissues: No acute abnormality.      Impression: No acute intracranial hemorrhage or depressed calvarial fracture.  Consider MRI for evaluation of possible occult seizure focus as  warranted.      MACRO  None      Signed by: Marck Henry 9/21/2024 2:13 PM  Dictation workstation:   [id number]  XR chest 2 views  Narrative: Interpreted By:  Maru Garcia,   STUDY:  XR CHEST 2 VIEWS 9/21/2024 1:28 pm       INDICATION:  Syncope vs seizure versus seizure      COMPARISON:  05/13/2022      ACCESSION NUMBER(S):  [id number]      ORDERING CLINICIAN:  DAVINA JOLLEY      TECHNIQUE:  PA and lateral views of the chest were acquired.      FINDINGS:  Cardiac size is normal with calcified plaque visible within the  aortic knob. There is a stable calcified granuloma within the right  upper lobe with lungs otherwise clear. No pleural abnormality is seen.      There are degenerative changes of the thoracic spine.      Impression: No acute cardiopulmonary disease.      Signed by: Maru Garcia 9/21/2024 2:12 PM  Dictation workstation:   [id number]      Physical Exam  Vitals reviewed.   Constitutional:       Appearance: Normal appearance.   HENT:      Head: Normocephalic and atraumatic.      Mouth/Throat:      Mouth: Mucous membranes are moist.   Eyes:      Conjunctiva/sclera: Conjunctivae normal.   Cardiovascular:      Rate and Rhythm: Normal rate.      Pulses: Normal pulses.   Pulmonary:      Effort: Pulmonary effort is normal.      Breath sounds: Normal breath sounds. No wheezing.   Abdominal:      General: Abdomen is flat. There is no distension.      Palpations: Abdomen is soft.      Tenderness: There is no guarding.   Musculoskeletal:         General: No swelling. Normal range of motion.   Skin:     General: Skin is warm and dry.   Neurological:      General: No focal deficit present.      Mental Status: He is alert. Mental status is at baseline.   Psychiatric:         Mood and Affect: Mood normal.         Behavior: Behavior normal.         Relevant Results               Assessment/Plan                  Assessment & Plan  Unresponsive episode    Seizure (Multi)    Syncope vs seizure  Hypomagnesemia     CAD  HTN  HLD      Appreciate neuro inpt.  MRI eval, check echo.  IV fluids + home meds  EEG  Lactic negative on arrival, so lower suspicion for seizure  Further imaging MRI pending  DVT prophy       Clarence Carr MD

## 2024-09-22 NOTE — PROGRESS NOTES
Physical Therapy    Physical Therapy Evaluation    Patient Name: Timo Crawford  MRN: [id number]  Department: [facility]  Room: [address] [place] [address]  Today's Date: 9/22/2024   Time Calculation  Start Time: 0941  Stop Time: 1016  Time Calculation (min): 35 min    Assessment/Plan   PT Assessment  PT Assessment Results: Decreased strength, Decreased endurance, Impaired balance, Decreased mobility, Decreased cognition, Impaired judgement, Decreased safety awareness  Rehab Prognosis: Good  Evaluation/Treatment Tolerance: Patient tolerated treatment well  End of Session Communication: Bedside nurse  Assessment Comment: 78 y/o male admitted with unresponsiveness. Of note; head CT (-) acute ICH and chest x-ray (-). Pt has significant PMH for dementia. PTA pt living at [facility] and independent with ambulation. Pt currently requires SBA for bed mobility, CGA for transfers and gait without AD short distances. Pt presents with impaired mobility, gait and balance. Pt would benefit from continued acute PT services and LOW intensity PT at [facility] to improve activity tolerance, balance and decrease risk for falls.  End of Session Patient Position: Bed, 3 rail up, Alarm on  IP OR SWING BED PT PLAN  Inpatient or Swing Bed: Inpatient  PT Plan  Treatment/Interventions: Bed mobility, Transfer training, Gait training, Balance training, Neuromuscular re-education, Strengthening, Endurance training, Therapeutic exercise, Therapeutic activity  PT Plan: Ongoing PT  PT Frequency: 3 times per week  PT Recommended Transfer Status: Assist x1  PT - OK to Discharge: Yes (when medically cleared)    *pt poor historian, unable to provide a reliable PLOF, PT secure chat with care transitions team as they have a call out to the family / contact. Additional information /  updated will be provided when contact calls back.     Subjective   General Visit Information:  General  Reason for Referral: 78 y/o male addmitted with concern for unresponsiveness.  He was walking outside when  he went to sit on a bench.  He was found unresponsive and was reportedly having generalized shaking for 1 minute. Pt reffered to PT for impaired mobility.  Referred By: Clarence Carr  Past Medical History Relevant to Rehab: PMH: hypertension, coronary artery disease, GERD, dementia  Family/Caregiver Present: No  Co-Treatment: OT  Co-Treatment Reason: to maximize saftey with progressive mobility  Prior to Session Communication: Bedside nurse  General Comment: Pt recived to PT supine in bed, on room air, bed alarm on, + glassess, with c/o (R) eye pain (RN notified). Pt pleasent and confused, agreeable to PT eval.  Home Living:  Home Living  Type of Home: Assisted living  Prior Level of Function:  Prior Function Per Pt/Caregiver Report  Level of [place]:  (Indepdent with ambulation without AD)  Precautions:  Precautions  Medical Precautions: Fall precautions (confusion)    Vitals:   Pre: HR 65, /68, SpO2 >95%  During: HR 54-78  Post: HR 78, /62, SpO2 >95%  *decreased SBP response to upright mobility, pt without c/o dizzyness     Objective   Pain: pt with complaints of (R) eye discomfort, RN notified      Cognition:  Cognition  Overall Cognitive Status: Impaired  Orientation Level: Disoriented to place, Disoriented to time, Disoriented to situation (oriented to self)  Following Commands: Follows one step commands with repetition  Safety Judgment: Decreased awareness of need for safety precautions  Problem Solving: Assistance required to identify errors made  Memory: Exceptions to WFL (impaired short term and long term memory noted; pt unable to provide current home location / set up or functional status)    General Assessments:    Activity Tolerance  Endurance: Tolerates 10 - 20 min exercise with multiple rests    Sensation  Sensation Comment: denies numbess / tingeling in LE    Strength  Strength Comments: hip flexion 4-/5 (B), otherwise 5/5 throughout (B) LE  Strength  Strength Comments: hip  flexion 4-/5 (B), otherwise 5/5 throughout (B) LE    Static Sitting Balance  Static Sitting-Comment/Number of Minutes: Supervision  Dynamic Sitting Balance  Dynamic Sitting-Comments: SBA    Static Standing Balance  Static Standing-Comment/Number of Minutes: SBA to CGA wihtout UE support  Dynamic Standing Balance  Dynamic Standing-Comments: SBA to CGA without UE support  Functional Assessments:    Bed Mobility  Bed Mobility: Yes  Bed Mobility 1  Bed Mobility Comments 1: supine <> sit: SBA    Transfers  Transfer: Yes  Transfer 1  Trials/Comments 1: sit <> stand x 3 trials: CGA without AD    Ambulation/Gait Training  Ambulation/Gait Training Performed: Yes  Ambulation/Gait Training 1  Comments/Distance (ft) 1: 15 feet x 1 trial; 60 feet x 1 trial; no AD, CGA at trunk; no LOB, gait deviations: decreased step length, slow anum, able to dual task (walk and talk)      Outcome Measures:  [facility] Basic Mobility  Turning from your back to your side while in a flat bed without using bedrails: None  Moving from lying on your back to sitting on the side of a flat bed without using bedrails: None  Moving to and from bed to chair (including a wheelchair): A little  Standing up from a chair using your arms (e.g. wheelchair or bedside chair): A little  To walk in hospital room: A little  Climbing 3-5 steps with railing: A little  Basic Mobility - Total Score: 20    Encounter Problems       Encounter Problems (Active)       Balance       STG - Maintains dynamic standing balance without upper extremity support       Start:  09/22/24       INTERVENTIONS:  1. Practice standing with minimal support.  2. Educate patient about standing tolerance.  3. Educate patient about independence with gait, transfers, and ADL's.  4. Educate patient about use of assistive device.  5. Educate patient about self-directed care.         Goal 1       Start:  09/22/24               Mobility       LTG - Patient will ambulate community distance       Start:   09/22/24               PT Transfers       STG - Transfer from bed to chair       Start:  09/22/24            STG - Patient will perform bed mobility       Start:  09/22/24            STG - Patient will transfer sit to and from stand       Start:  09/22/24               Pain - Adult              Education Documentation  Precautions, taught by Rosmery Mujica, PT at 9/22/2024  2:12 PM.  Learner: Patient  Readiness: Eager  Method: Explanation  Response: No Evidence of Learning  Comment: educated on fall risk, calling for assist    Mobility Training, taught by Rosmery Mujica, PT at 9/22/2024  2:12 PM.  Learner: Patient  Readiness: Eager  Method: Explanation  Response: No Evidence of Learning  Comment: educated on fall risk, calling for assist    Education Comments  No comments found.

## 2024-09-22 NOTE — CARE PLAN
The patient's goals for the shift include  safety    The clinical goals for the shift include Patient will remain safe and free from falls throughout the shift.      Problem: Pain - Adult  Goal: Verbalizes/displays adequate comfort level or baseline comfort level  Outcome: Progressing     Problem: Safety - Adult  Goal: Free from fall injury  Outcome: Progressing     Problem: Discharge Planning  Goal: Discharge to home or other facility with appropriate resources  Outcome: Progressing     Problem: Chronic Conditions and Co-morbidities  Goal: Patient's chronic conditions and co-morbidity symptoms are monitored and maintained or improved  Outcome: Progressing     Problem: Fall/Injury  Goal: Not fall by end of shift  Outcome: Progressing

## 2024-09-22 NOTE — PROGRESS NOTES
Attempted to call patients wife for admission assessment, phone number listed is disconnected. Call placed to patients contact Prashant, left message requesting for return call.

## 2024-09-22 NOTE — PROGRESS NOTES
Occupational Therapy    Evaluation    Patient Name: Timo Crawford  MRN: [id number]  Department: [facility]  Room: [address] [place] [address]  Today's Date: 9/22/2024  Time Calculation  Start Time: 0942  Stop Time: 1017  Time Calculation (min): 35 min    Assessment  IP OT Assessment  OT Assessment: Pt is an 79 year old male demonstrating a decline in adl/functional mobility after becoming unresponsive in a park. Recommend  low intensity OT tx intervention.Good prognosis for goal attainment  End of Session Communication: Bedside nurse  End of Session Patient Position: Bed, 3 rail up, Alarm on  Plan:  OT Frequency: 2 times per week  OT Discharge Recommendations: Low intensity level of continued care  OT - OK to Discharge:  (okay to discharge pt to next level of care pending medical team clearance)    Subjective   Current Problem:  1. Unresponsive episode  Transthoracic Echo (TTE) Complete    Transthoracic Echo (TTE) Complete    Holter Or Event Cardiac Monitor    Holter Or Event Cardiac Monitor      2. Bradycardia  Transthoracic Echo (TTE) Complete    Transthoracic Echo (TTE) Complete      3. History of non-ST elevation myocardial infarction (NSTEMI)  Transthoracic Echo (TTE) Complete    Transthoracic Echo (TTE) Complete      4. Seizure (Multi)  Holter Or Event Cardiac Monitor    Holter Or Event Cardiac Monitor      5. Transient cerebral ischemia, unspecified type  Holter Or Event Cardiac Monitor    Holter Or Event Cardiac Monitor        General:  General  Reason for Referral: 80 y/o male admitted with concern for unresponsiveness.  He was walking outside when he went to sit on a bench.  He was found unresponsive and was reportedly having generalized shaking for 1 minute. Pt referred to OT for impaired functional daily living skills  Referred By: Clarence Carr  Past Medical History Relevant to Rehab: PMH: hypertension, coronary artery disease, GERD, dementia  Family/Caregiver Present: No  Co-Treatment: PT  Co-Treatment Reason: to  maximize saftey with progressive mobility  Prior to Session Communication: Bedside nurse  General Comment: Pt  supine in bed, on room air, bed alarm on, + glasses, with c/o (R) eye pain (RN notified). Pt pleasant and confused, agreeable to PT eval.  Precautions:  Medical Precautions: Fall precautions (confusion)       Pain:right eye sensitivity     Objective   Cognition:  Overall Cognitive Status: Impaired  Orientation Level: Disoriented to place, Disoriented to time, Disoriented to situation (oriented to self)  Following Commands: Follows one step commands with repetition  Safety Judgment: Decreased awareness of need for safety precautions  Problem Solving: Assistance required to identify errors made  Memory:  (impaired short term and long term memory noted; pt unable to provide current home location / set up or functional status)       Home Living:  Type of Home: Assisted living   Prior Function:  Level of [place]:  (Indepdent with ambulation without AD)  IADL History:unknown - pt demonstrates decreased memory/cognition     ADL:pta indep     Activity Tolerance:  Endurance: Tolerates 10 - 20 min exercise with multiple rests  Bed Mobility/Transfers: Bed Mobility  Bed Mobility: Yes  Bed Mobility 1  Bed Mobility Comments 1: supine <> sit: SBA    Transfers  Transfer: Yes  Transfer 1  Trials/Comments 1: sit <> stand x 3 trials: CGA without AD      Functional Mobility:  Functional Mobility  Functional Mobility Performed:  (home distances with a gait belt requiring cga)  Sitting Balance: demonstrates posterior lean with difficulty with following directions to keep feet on the floor     Standing Balance:required cga to complete buddy area hygiene (static/dynamic standing) -posterior and min/mod cuing for  thoroughness in cleaning bowel movement x 8 minutes Pt required mod/max verbal and  tactile cues utilizing  sba/cg  to complete lower extremity donning/doffing underwear/ pants over hips, sba for socks /sitting x 12  minutes. Pt demonstrates confusion and constant redirection for safety during functional performance.Poor memory was noted      IADL's: unknown     Vision: Vision - Basic Assessment  Current Vision:  (r eye watery/blood shot, c/o of blurred vision. Shade drawn in patient's room to decrease klight sensitivity for comfort)  Sensation:  Light Touch: No apparent deficits  Sensation Comment: denies numbess / tingeling in LE  Strength:  Strength Comments: strength below elbows wfl  Perception visual field impairment  right upper quadrant     Coordination:wfl      Hand Function:  Hand Function  Gross Grasp: Functional  Extremities: RUE   RUE : Within Functional Limits and LUE   LUE: Within Functional Limits    Outcome Measures: [facility] Daily Activity  Putting on and taking off regular lower body clothing: A little  Bathing (including washing, rinsing, drying): A little  Putting on and taking off regular upper body clothing: A little  Toileting, which includes using toilet, bedpan or urinal: A little  Taking care of personal grooming such as brushing teeth: A little (sink level)  Eating Meals: None  Daily Activity - Total Score: 19      Education Documentation  Precautions, taught by Yamileth Martinez OT at 9/22/2024  5:22 PM.  Learner: Patient  Readiness: Acceptance  Method: Demonstration  Response: Demonstrated Understanding, Needs Reinforcement    Mobility Training, taught by Yamileth Martinez OT at 9/22/2024  5:22 PM.  Learner: Patient  Readiness: Acceptance  Method: Demonstration  Response: Demonstrated Understanding, Needs Reinforcement    Education Comments  No comments found.      Goals:   Encounter Problems       Encounter Problems (Active)       decreased functional daily living skills       Pt will increase Functional Transfers Bed Mobility to s/mod indep/indep    Sit to Stand  to s/mod indep/indep    Functional Mobility  with /without a device to s/mod indep chair/toliet/room to increase indep/safety in patients  discharge environment       Start:  09/22/24    Expected End:  10/06/24               impaired functional daily living skills       Pt will increase Grooming to s/mod indep   Upper Body Bathing to s/mod indep     Lower Body Bathing  to s/mod indep    Increase Upper Body Dressing  to s/mod indep/indep  and LE Dressing to s/mod indep with/ without adaptive equipment as needed       Start:  09/22/24    Expected End:  10/06/24            Pt will increase cognition to sba  to increase indep/safety  using auditory/visual tactile/ cues and orientation x 2-3 to increase indep and safety in within discharge environment        Start:  09/22/24    Expected End:  10/06/24

## 2024-09-23 ENCOUNTER — APPOINTMENT (OUTPATIENT)
Dept: CARDIOLOGY | Facility: HOSPITAL | Age: 79
DRG: 101 | End: 2024-09-23
Payer: MEDICARE

## 2024-09-23 ENCOUNTER — APPOINTMENT (OUTPATIENT)
Dept: NEUROLOGY | Facility: HOSPITAL | Age: 79
DRG: 101 | End: 2024-09-23
Payer: MEDICARE

## 2024-09-23 ENCOUNTER — APPOINTMENT (OUTPATIENT)
Dept: RADIOLOGY | Facility: HOSPITAL | Age: 79
DRG: 101 | End: 2024-09-23
Payer: MEDICARE

## 2024-09-23 LAB
ANION GAP SERPL CALC-SCNC: 11 MMOL/L (ref 10–20)
AORTIC VALVE MEAN GRADIENT: 4 MMHG
AORTIC VALVE PEAK VELOCITY: 1.22 M/S
AV PEAK GRADIENT: 6 MMHG
AVA (PEAK VEL): 3.4 CM2
AVA (VTI): 3.31 CM2
BODY SURFACE AREA: 1.95 M2
BUN SERPL-MCNC: 18 MG/DL (ref 6–23)
CALCIUM SERPL-MCNC: 8.6 MG/DL (ref 8.6–10.3)
CHLORIDE SERPL-SCNC: 101 MMOL/L (ref 98–107)
CO2 SERPL-SCNC: 27 MMOL/L (ref 21–32)
CREAT SERPL-MCNC: 0.84 MG/DL (ref 0.5–1.3)
EGFRCR SERPLBLD CKD-EPI 2021: 88 ML/MIN/1.73M*2
EJECTION FRACTION APICAL 4 CHAMBER: 66.8
EJECTION FRACTION: 58 %
ERYTHROCYTE [DISTWIDTH] IN BLOOD BY AUTOMATED COUNT: 13.4 % (ref 11.5–14.5)
GLUCOSE SERPL-MCNC: 92 MG/DL (ref 74–99)
HCT VFR BLD AUTO: 34.1 % (ref 41–52)
HGB BLD-MCNC: 11.8 G/DL (ref 13.5–17.5)
LEFT ATRIUM VOLUME AREA LENGTH INDEX BSA: 19.7 ML/M2
LEFT VENTRICLE INTERNAL DIMENSION DIASTOLE: 3.9 CM (ref 3.5–6)
LEFT VENTRICULAR OUTFLOW TRACT DIAMETER: 2.3 CM
MAGNESIUM SERPL-MCNC: 1.44 MG/DL (ref 1.6–2.4)
MCH RBC QN AUTO: 32.2 PG (ref 26–34)
MCHC RBC AUTO-ENTMCNC: 34.6 G/DL (ref 32–36)
MCV RBC AUTO: 93 FL (ref 80–100)
MITRAL VALVE E/A RATIO: 1.33
NRBC BLD-RTO: 0 /100 WBCS (ref 0–0)
PLATELET # BLD AUTO: 264 X10*3/UL (ref 150–450)
POTASSIUM SERPL-SCNC: 3.5 MMOL/L (ref 3.5–5.3)
RBC # BLD AUTO: 3.66 X10*6/UL (ref 4.5–5.9)
RIGHT VENTRICLE FREE WALL PEAK S': 11.3 CM/S
RIGHT VENTRICLE PEAK SYSTOLIC PRESSURE: 24.2 MMHG
SODIUM SERPL-SCNC: 135 MMOL/L (ref 136–145)
TRICUSPID ANNULAR PLANE SYSTOLIC EXCURSION: 1.8 CM
WBC # BLD AUTO: 7.2 X10*3/UL (ref 4.4–11.3)

## 2024-09-23 PROCEDURE — 95816 EEG AWAKE AND DROWSY: CPT

## 2024-09-23 PROCEDURE — 2500000001 HC RX 250 WO HCPCS SELF ADMINISTERED DRUGS (ALT 637 FOR MEDICARE OP): Performed by: INTERNAL MEDICINE

## 2024-09-23 PROCEDURE — 36415 COLL VENOUS BLD VENIPUNCTURE: CPT | Performed by: INTERNAL MEDICINE

## 2024-09-23 PROCEDURE — 83735 ASSAY OF MAGNESIUM: CPT | Performed by: INTERNAL MEDICINE

## 2024-09-23 PROCEDURE — 2500000004 HC RX 250 GENERAL PHARMACY W/ HCPCS (ALT 636 FOR OP/ED): Performed by: INTERNAL MEDICINE

## 2024-09-23 PROCEDURE — 95816 EEG AWAKE AND DROWSY: CPT | Performed by: PSYCHIATRY & NEUROLOGY

## 2024-09-23 PROCEDURE — 99233 SBSQ HOSP IP/OBS HIGH 50: CPT | Performed by: INTERNAL MEDICINE

## 2024-09-23 PROCEDURE — 80048 BASIC METABOLIC PNL TOTAL CA: CPT | Performed by: INTERNAL MEDICINE

## 2024-09-23 PROCEDURE — 93247 EXT ECG>7D<15D SCAN A/R: CPT

## 2024-09-23 PROCEDURE — 93306 TTE W/DOPPLER COMPLETE: CPT

## 2024-09-23 PROCEDURE — 2500000002 HC RX 250 W HCPCS SELF ADMINISTERED DRUGS (ALT 637 FOR MEDICARE OP, ALT 636 FOR OP/ED): Performed by: PSYCHIATRY & NEUROLOGY

## 2024-09-23 PROCEDURE — 85027 COMPLETE CBC AUTOMATED: CPT | Performed by: INTERNAL MEDICINE

## 2024-09-23 PROCEDURE — 99233 SBSQ HOSP IP/OBS HIGH 50: CPT | Performed by: PSYCHIATRY & NEUROLOGY

## 2024-09-23 PROCEDURE — 70551 MRI BRAIN STEM W/O DYE: CPT

## 2024-09-23 PROCEDURE — 1200000002 HC GENERAL ROOM WITH TELEMETRY DAILY

## 2024-09-23 PROCEDURE — 70547 MR ANGIOGRAPHY NECK W/O DYE: CPT | Mod: 59

## 2024-09-23 PROCEDURE — 70544 MR ANGIOGRAPHY HEAD W/O DYE: CPT | Mod: 59

## 2024-09-23 PROCEDURE — 93306 TTE W/DOPPLER COMPLETE: CPT | Performed by: INTERNAL MEDICINE

## 2024-09-23 RX ORDER — MAGNESIUM SULFATE HEPTAHYDRATE 40 MG/ML
2 INJECTION, SOLUTION INTRAVENOUS ONCE
Status: COMPLETED | OUTPATIENT
Start: 2024-09-23 | End: 2024-09-23

## 2024-09-23 RX ORDER — ACETAMINOPHEN 500 MG
5 TABLET ORAL NIGHTLY
Status: DISCONTINUED | OUTPATIENT
Start: 2024-09-23 | End: 2024-09-25 | Stop reason: HOSPADM

## 2024-09-23 RX ORDER — HALOPERIDOL 5 MG/ML
2 INJECTION INTRAMUSCULAR NIGHTLY PRN
Status: DISCONTINUED | OUTPATIENT
Start: 2024-09-23 | End: 2024-09-25 | Stop reason: HOSPADM

## 2024-09-23 RX ADMIN — DORZOLAMIDE HYDROCHLORIDE TIMOLOL MALEATE 1 DROP: 20; 5 SOLUTION/ DROPS OPHTHALMIC at 21:14

## 2024-09-23 RX ADMIN — DORZOLAMIDE HYDROCHLORIDE TIMOLOL MALEATE 1 DROP: 20; 5 SOLUTION/ DROPS OPHTHALMIC at 08:08

## 2024-09-23 RX ADMIN — SODIUM CHLORIDE, POTASSIUM CHLORIDE, SODIUM LACTATE AND CALCIUM CHLORIDE 500 ML: 600; 310; 30; 20 INJECTION, SOLUTION INTRAVENOUS at 12:53

## 2024-09-23 RX ADMIN — ASPIRIN 81 MG CHEWABLE TABLET 81 MG: 81 TABLET CHEWABLE at 08:07

## 2024-09-23 RX ADMIN — ENOXAPARIN SODIUM 40 MG: 40 INJECTION SUBCUTANEOUS at 16:10

## 2024-09-23 RX ADMIN — FINASTERIDE 5 MG: 5 TABLET, FILM COATED ORAL at 08:06

## 2024-09-23 RX ADMIN — OXCARBAZEPINE 600 MG: 150 TABLET, FILM COATED ORAL at 08:07

## 2024-09-23 RX ADMIN — OXCARBAZEPINE 600 MG: 150 TABLET, FILM COATED ORAL at 21:14

## 2024-09-23 RX ADMIN — AMITRIPTYLINE HYDROCHLORIDE 10 MG: 10 TABLET, FILM COATED ORAL at 21:14

## 2024-09-23 RX ADMIN — ATORVASTATIN CALCIUM 80 MG: 80 TABLET, FILM COATED ORAL at 21:14

## 2024-09-23 RX ADMIN — MAGNESIUM SULFATE HEPTAHYDRATE 2 G: 40 INJECTION, SOLUTION INTRAVENOUS at 13:30

## 2024-09-23 RX ADMIN — Medication 5 MG: at 21:14

## 2024-09-23 ASSESSMENT — COGNITIVE AND FUNCTIONAL STATUS - GENERAL
TOILETING: A LITTLE
STANDING UP FROM CHAIR USING ARMS: A LITTLE
MOVING FROM LYING ON BACK TO SITTING ON SIDE OF FLAT BED WITH BEDRAILS: A LITTLE
WALKING IN HOSPITAL ROOM: A LITTLE
MOVING TO AND FROM BED TO CHAIR: A LITTLE
HELP NEEDED FOR BATHING: A LITTLE
DRESSING REGULAR LOWER BODY CLOTHING: A LITTLE
PERSONAL GROOMING: A LITTLE
MOBILITY SCORE: 17
DAILY ACTIVITIY SCORE: 19
WALKING IN HOSPITAL ROOM: A LITTLE
HELP NEEDED FOR BATHING: A LITTLE
MOVING FROM LYING ON BACK TO SITTING ON SIDE OF FLAT BED WITH BEDRAILS: A LITTLE
MOBILITY SCORE: 18
MOVING TO AND FROM BED TO CHAIR: A LITTLE
CLIMB 3 TO 5 STEPS WITH RAILING: A LOT
DRESSING REGULAR UPPER BODY CLOTHING: A LITTLE
DAILY ACTIVITIY SCORE: 19
DRESSING REGULAR LOWER BODY CLOTHING: A LITTLE
CLIMB 3 TO 5 STEPS WITH RAILING: A LITTLE
TURNING FROM BACK TO SIDE WHILE IN FLAT BAD: A LITTLE
TOILETING: A LITTLE
TURNING FROM BACK TO SIDE WHILE IN FLAT BAD: A LITTLE
PERSONAL GROOMING: A LITTLE
DRESSING REGULAR UPPER BODY CLOTHING: A LITTLE
STANDING UP FROM CHAIR USING ARMS: A LITTLE

## 2024-09-23 ASSESSMENT — PAIN SCALES - GENERAL
PAINLEVEL_OUTOF10: 0 - NO PAIN
PAINLEVEL_OUTOF10: 0 - NO PAIN

## 2024-09-23 ASSESSMENT — PAIN - FUNCTIONAL ASSESSMENT: PAIN_FUNCTIONAL_ASSESSMENT: 0-10

## 2024-09-23 NOTE — CARE PLAN
Problem: Pain - Adult  Goal: Verbalizes/displays adequate comfort level or baseline comfort level  Outcome: Progressing     Problem: Safety - Adult  Goal: Free from fall injury  Outcome: Progressing     Problem: Discharge Planning  Goal: Discharge to home or other facility with appropriate resources  Outcome: Progressing     Problem: Chronic Conditions and Co-morbidities  Goal: Patient's chronic conditions and co-morbidity symptoms are monitored and maintained or improved  Outcome: Progressing     Problem: Fall/Injury  Goal: Not fall by end of shift  Outcome: Progressing   The patient's goals for the shift include      The clinical goals for the shift include maintain comfort/safety throughout the shift

## 2024-09-23 NOTE — PROGRESS NOTES
"Timo Crawford is a 79 y.o. male on day 0 of admission presenting with Unresponsive episode.      Subjective   Getting EEG done.  MRI brain results are pending.   Remains confused on the date/ month and memory of the event prior to the hospital.  He thinks he lives in [place].        Objective     Last Recorded Vitals  Blood pressure 124/67, pulse 60, temperature 36.2 °C (97.2 °F), temperature source Temporal, resp. rate 18, height 1.803 m (5' 11\"), weight 75.8 kg (167 lb), SpO2 95%.    Appearance:  no acute distress.  HEENT: normocephalic /atraumatic.  Cardiovascular/Lungs/Abdomen: No carotid bruits to auscultation bilaterally, heart is regular in rate and rhythm.  Extremities/Skin: no peripheral edema.    NEUROLOGICAL EXAMINATION:    Mental status:  alert and oriented to person only. Has difficult time repeating.  Able to name.  Speech is intact but is confused.     Cranial nerves:    II/III: Visual fields are full. Pupils are 2 mm and reactive bilaterally.  III/IV/VI: Extraocular movements are full with no nystagmus.   V: Facial sensation is intact to light touch.  VII: Face is symmetric.  VIII: hearing is intact bilaterally.  IX/X: Palate elevates symmetrically to phonation.  XI: Sternocleidomastoid is MRC 5/5 to strength testing.  XII: Tongue is midline.    Motor exam: Strength is MRC 5/5 throughout. tone is normal.    Sensory exam: Sensation is intact to light touch throughout.    Reflexes: Reflexes are 2+ and symmetric. Bilateral plantar responses are flexor.    Coordination: intact    Relevant Results  Scheduled medications  amitriptyline, 10 mg, oral, Nightly  aspirin, 81 mg, oral, Daily  atorvastatin, 80 mg, oral, Nightly  dorzolamide-timoloL, 1 drop, Right Eye, BID  enoxaparin, 40 mg, subcutaneous, q24h  finasteride, 5 mg, oral, Daily  [Held by provider] hydroCHLOROthiazide, 25 mg, oral, Daily  lactated Ringer's, 500 mL, intravenous, Once  magnesium sulfate, 2 g, intravenous, Once  melatonin, 5 mg, oral, " Nightly  metoprolol succinate XL, 25 mg, oral, Daily  OXcarbazepine, 600 mg, oral, BID  perflutren lipid microspheres, 0.5-10 mL of dilution, intravenous, Once in imaging  perflutren protein A microsphere, 0.5 mL, intravenous, Once in imaging  sulfur hexafluoride microsphr, 2 mL, intravenous, Once in imaging      Continuous medications     PRN medications  PRN medications: acetaminophen **OR** acetaminophen **OR** acetaminophen, benzocaine-menthol, haloperidol lactate, polyethylene glycol  Results for orders placed or performed during the hospital encounter of 09/21/24 (from the past 24 hour(s))   Basic metabolic panel   Result Value Ref Range    Glucose 92 74 - 99 mg/dL    Sodium 135 (L) 136 - 145 mmol/L    Potassium 3.5 3.5 - 5.3 mmol/L    Chloride 101 98 - 107 mmol/L    Bicarbonate 27 21 - 32 mmol/L    Anion Gap 11 10 - 20 mmol/L    Urea Nitrogen 18 6 - 23 mg/dL    Creatinine 0.84 0.50 - 1.30 mg/dL    eGFR 88 >60 mL/min/1.73m*2    Calcium 8.6 8.6 - 10.3 mg/dL   Magnesium   Result Value Ref Range    Magnesium 1.44 (L) 1.60 - 2.40 mg/dL   CBC   Result Value Ref Range    WBC 7.2 4.4 - 11.3 x10*3/uL    nRBC 0.0 0.0 - 0.0 /100 WBCs    RBC 3.66 (L) 4.50 - 5.90 x10*6/uL    Hemoglobin 11.8 (L) 13.5 - 17.5 g/dL    Hematocrit 34.1 (L) 41.0 - 52.0 %    MCV 93 80 - 100 fL    MCH 32.2 26.0 - 34.0 pg    MCHC 34.6 32.0 - 36.0 g/dL    RDW 13.4 11.5 - 14.5 %    Platelets 264 150 - 450 x10*3/uL      ECG 12 lead    Result Date: 9/23/2024  Sinus rhythm RBBB and LAFB    CT head wo IV contrast    Result Date: 9/21/2024  Interpreted By:  Marck Henry, STUDY: CT HEAD WO IV CONTRAST;  9/21/2024 1:08 pm   INDICATION: Signs/Symptoms:syncope vs seizure.   COMPARISON: CT head 02/06/2024.   ACCESSION NUMBER(S): [id number]   ORDERING CLINICIAN: DAVINA JOLLEY   TECHNIQUE: Noncontrast axial CT scan of head was performed.   FINDINGS: Parenchyma: No intracranial hemorrhage. The grey-white differentiation is intact. No mass effect or midline  shift. Scattered periventricular white matter hypodensities, likely chronic microvascular ischemic change.   CSF Spaces: The ventricles, sulci and basal cisterns are within normal limits for age.   Extra-Axial Fluid: None.   Calvarium: No acute depressed fracture.   Paranasal sinuses: Visualized paranasal sinuses are clear.   Mastoids: Clear.   Orbits: No acute abnormality.   Soft tissues: No acute abnormality.       No acute intracranial hemorrhage or depressed calvarial fracture. Consider MRI for evaluation of possible occult seizure focus as warranted.   MACRO None   Signed by: Marck Henry 9/21/2024 2:13 PM Dictation workstation:   [id number]    XR chest 2 views    Result Date: 9/21/2024  Interpreted By:  Maru Garcia, STUDY: XR CHEST 2 VIEWS 9/21/2024 1:28 pm   INDICATION: Syncope vs seizure versus seizure   COMPARISON: 05/13/2022   ACCESSION NUMBER(S): [id number]   ORDERING CLINICIAN: DAVINA JOLLEY   TECHNIQUE: PA and lateral views of the chest were acquired.   FINDINGS: Cardiac size is normal with calcified plaque visible within the aortic knob. There is a stable calcified granuloma within the right upper lobe with lungs otherwise clear. No pleural abnormality is seen.   There are degenerative changes of the thoracic spine.       No acute cardiopulmonary disease.        Assessment/Plan:   Dementia, ? Seizure.   Pending MRI brain and EEG results.  Continue Trileptal 600mg BID.    Monitor electrolytes.   Replace Mg.   Seizure precautions.  Will need follow up with his neurologist, Dr. Mera, on discharge.    Please call neurology on-call for any questions or concerns.

## 2024-09-23 NOTE — PROGRESS NOTES
Timo Crawford is a 79 y.o. male on day 0 of admission presenting with Unresponsive episode.      Subjective   Up in bed.  No complaints.   Per reports, pulling at IV overnight.       Objective     Last Recorded Vitals  /67 (BP Location: Right arm, Patient Position: Lying)   Pulse 60   Temp 36.2 °C (97.2 °F) (Temporal)   Resp 18   Wt 75.8 kg (167 lb)   SpO2 95%   Intake/Output last 3 Shifts:    Intake/Output Summary (Last 24 hours) at 9/23/2024 1138  Last data filed at 9/22/2024 1749  Gross per 24 hour   Intake 633.33 ml   Output --   Net 633.33 ml       Admission Weight  Weight: 75.8 kg (167 lb) (09/21/24 1302)    Daily Weight  09/21/24 : 75.8 kg (167 lb)    Image Results  ECG 12 lead  Sinus rhythm  RBBB and LAFB      Physical Exam  Vitals reviewed.   Constitutional:       Appearance: Normal appearance.   HENT:      Head: Normocephalic and atraumatic.      Mouth/Throat:      Mouth: Mucous membranes are moist.   Eyes:      Conjunctiva/sclera: Conjunctivae normal.   Cardiovascular:      Rate and Rhythm: Normal rate.      Pulses: Normal pulses.   Pulmonary:      Effort: Pulmonary effort is normal.      Breath sounds: Normal breath sounds. No wheezing.   Abdominal:      General: Abdomen is flat. There is no distension.      Palpations: Abdomen is soft.      Tenderness: There is no guarding.   Musculoskeletal:         General: No swelling. Normal range of motion.   Skin:     General: Skin is warm and dry.   Neurological:      General: No focal deficit present.      Mental Status: He is alert. Mental status is at baseline.   Psychiatric:         Mood and Affect: Mood normal.         Behavior: Behavior normal.         Relevant Results               Assessment/Plan                  Assessment & Plan  Unresponsive episode    Seizure (Multi)    Syncope vs seizure  Hypomagnesemia  Dementia/ herpetic neuralgia  CAD  HTN  HLD      Appreciate neuro inpt- also ruling out stroke  MRI eval, check echo.  Home meds,  aspirin, statin  EEG this am  OK to stop IV fluids, delirium protocol.  Oxcarbazepine dose increased by neuro  Follow up outpatient with usual neurologist after dc  DVT feliciano-Yeny Carr MD

## 2024-09-23 NOTE — CARE PLAN
The patient's goals for the shift include  sleep, comfort and safety    The clinical goals for the shift include Patient will remain safe and free from falls throughout the shift.

## 2024-09-23 NOTE — PROGRESS NOTES
24 [id number]   Discharge Planning   Living Arrangements Other (Comment)   Support Systems Family members;Other (Comment)   Assistance Needed ADLs- From [facility]   Type of Residence Assisted living   Care Facility Name [facility]   Who is requesting discharge planning? Provider   Home or Post Acute Services None   Expected Discharge Disposition Home   Does the patient need discharge transport arranged? Yes   RoundTrip coordination needed? Yes     TCC Note: Spoke with wife, introduced self and explained role on the Transition of Care Team. Verified name, , demographics, insurance and PCP. ER contact is Chago esquivel, phone: [phone]. Pt uses walker. Tripped over walker about a month ago with no injuries. Facility manages transportation and medication administration. Plan is for pt to return to [facility] (formerly [facility]) upon discharge. Will continue to follow. Yamini Brannon, MSN, RN, TCC.

## 2024-09-24 LAB
ANION GAP SERPL CALC-SCNC: 9 MMOL/L (ref 10–20)
APPEARANCE UR: CLEAR
ATRIAL RATE: 61 BPM
BILIRUB UR STRIP.AUTO-MCNC: NEGATIVE MG/DL
BUN SERPL-MCNC: 18 MG/DL (ref 6–23)
CALCIUM SERPL-MCNC: 8.6 MG/DL (ref 8.6–10.3)
CHLORIDE SERPL-SCNC: 102 MMOL/L (ref 98–107)
CO2 SERPL-SCNC: 27 MMOL/L (ref 21–32)
COLOR UR: YELLOW
CREAT SERPL-MCNC: 0.81 MG/DL (ref 0.5–1.3)
EGFRCR SERPLBLD CKD-EPI 2021: 89 ML/MIN/1.73M*2
ERYTHROCYTE [DISTWIDTH] IN BLOOD BY AUTOMATED COUNT: 13.7 % (ref 11.5–14.5)
GLUCOSE SERPL-MCNC: 89 MG/DL (ref 74–99)
GLUCOSE UR STRIP.AUTO-MCNC: NORMAL MG/DL
HCT VFR BLD AUTO: 35.4 % (ref 41–52)
HGB BLD-MCNC: 12.1 G/DL (ref 13.5–17.5)
KETONES UR STRIP.AUTO-MCNC: NEGATIVE MG/DL
LEUKOCYTE ESTERASE UR QL STRIP.AUTO: NEGATIVE
MAGNESIUM SERPL-MCNC: 1.72 MG/DL (ref 1.6–2.4)
MCH RBC QN AUTO: 32.1 PG (ref 26–34)
MCHC RBC AUTO-ENTMCNC: 34.2 G/DL (ref 32–36)
MCV RBC AUTO: 94 FL (ref 80–100)
MUCOUS THREADS #/AREA URNS AUTO: NORMAL /LPF
NITRITE UR QL STRIP.AUTO: NEGATIVE
NRBC BLD-RTO: 0 /100 WBCS (ref 0–0)
P AXIS: 66 DEGREES
PH UR STRIP.AUTO: 6.5 [PH]
PLATELET # BLD AUTO: 255 X10*3/UL (ref 150–450)
POTASSIUM SERPL-SCNC: 3.6 MMOL/L (ref 3.5–5.3)
PR INTERVAL: 159 MS
PROT UR STRIP.AUTO-MCNC: NORMAL MG/DL
Q ONSET: 251 MS
QRS COUNT: 10 BEATS
QRS DURATION: 145 MS
QT INTERVAL: 451 MS
QTC CALCULATION(BAZETT): 451 MS
QTC FREDERICIA: 451 MS
R AXIS: -66 DEGREES
RBC # BLD AUTO: 3.77 X10*6/UL (ref 4.5–5.9)
RBC # UR STRIP.AUTO: NEGATIVE /UL
RBC #/AREA URNS AUTO: NORMAL /HPF
SARS-COV-2 RNA RESP QL NAA+PROBE: NOT DETECTED
SODIUM SERPL-SCNC: 134 MMOL/L (ref 136–145)
SP GR UR STRIP.AUTO: 1.02
T AXIS: 16 DEGREES
T OFFSET: 477 MS
UROBILINOGEN UR STRIP.AUTO-MCNC: NORMAL MG/DL
VENTRICULAR RATE: 60 BPM
WBC # BLD AUTO: 6.8 X10*3/UL (ref 4.4–11.3)
WBC #/AREA URNS AUTO: NORMAL /HPF

## 2024-09-24 PROCEDURE — 83735 ASSAY OF MAGNESIUM: CPT | Performed by: INTERNAL MEDICINE

## 2024-09-24 PROCEDURE — 2500000004 HC RX 250 GENERAL PHARMACY W/ HCPCS (ALT 636 FOR OP/ED): Performed by: INTERNAL MEDICINE

## 2024-09-24 PROCEDURE — 2500000001 HC RX 250 WO HCPCS SELF ADMINISTERED DRUGS (ALT 637 FOR MEDICARE OP): Performed by: STUDENT IN AN ORGANIZED HEALTH CARE EDUCATION/TRAINING PROGRAM

## 2024-09-24 PROCEDURE — 81003 URINALYSIS AUTO W/O SCOPE: CPT | Performed by: STUDENT IN AN ORGANIZED HEALTH CARE EDUCATION/TRAINING PROGRAM

## 2024-09-24 PROCEDURE — 36415 COLL VENOUS BLD VENIPUNCTURE: CPT | Performed by: INTERNAL MEDICINE

## 2024-09-24 PROCEDURE — 1200000002 HC GENERAL ROOM WITH TELEMETRY DAILY

## 2024-09-24 PROCEDURE — 80048 BASIC METABOLIC PNL TOTAL CA: CPT | Performed by: INTERNAL MEDICINE

## 2024-09-24 PROCEDURE — 99232 SBSQ HOSP IP/OBS MODERATE 35: CPT | Performed by: PSYCHIATRY & NEUROLOGY

## 2024-09-24 PROCEDURE — 85027 COMPLETE CBC AUTOMATED: CPT | Performed by: INTERNAL MEDICINE

## 2024-09-24 PROCEDURE — 2500000001 HC RX 250 WO HCPCS SELF ADMINISTERED DRUGS (ALT 637 FOR MEDICARE OP): Performed by: INTERNAL MEDICINE

## 2024-09-24 PROCEDURE — 99232 SBSQ HOSP IP/OBS MODERATE 35: CPT | Performed by: STUDENT IN AN ORGANIZED HEALTH CARE EDUCATION/TRAINING PROGRAM

## 2024-09-24 PROCEDURE — 87635 SARS-COV-2 COVID-19 AMP PRB: CPT | Performed by: STUDENT IN AN ORGANIZED HEALTH CARE EDUCATION/TRAINING PROGRAM

## 2024-09-24 PROCEDURE — 2500000002 HC RX 250 W HCPCS SELF ADMINISTERED DRUGS (ALT 637 FOR MEDICARE OP, ALT 636 FOR OP/ED): Performed by: PSYCHIATRY & NEUROLOGY

## 2024-09-24 RX ORDER — POTASSIUM CHLORIDE 1.5 G/1.58G
20 POWDER, FOR SOLUTION ORAL ONCE
Status: COMPLETED | OUTPATIENT
Start: 2024-09-24 | End: 2024-09-24

## 2024-09-24 RX ADMIN — Medication 5 MG: at 20:35

## 2024-09-24 RX ADMIN — DORZOLAMIDE HYDROCHLORIDE TIMOLOL MALEATE 1 DROP: 20; 5 SOLUTION/ DROPS OPHTHALMIC at 20:35

## 2024-09-24 RX ADMIN — ENOXAPARIN SODIUM 40 MG: 40 INJECTION SUBCUTANEOUS at 15:47

## 2024-09-24 RX ADMIN — FINASTERIDE 5 MG: 5 TABLET, FILM COATED ORAL at 09:58

## 2024-09-24 RX ADMIN — POTASSIUM CHLORIDE 20 MEQ: 1.5 POWDER, FOR SOLUTION ORAL at 13:41

## 2024-09-24 RX ADMIN — OXCARBAZEPINE 600 MG: 150 TABLET, FILM COATED ORAL at 20:35

## 2024-09-24 RX ADMIN — DORZOLAMIDE HYDROCHLORIDE TIMOLOL MALEATE 1 DROP: 20; 5 SOLUTION/ DROPS OPHTHALMIC at 09:58

## 2024-09-24 RX ADMIN — ATORVASTATIN CALCIUM 80 MG: 80 TABLET, FILM COATED ORAL at 20:35

## 2024-09-24 RX ADMIN — ASPIRIN 81 MG CHEWABLE TABLET 81 MG: 81 TABLET CHEWABLE at 09:57

## 2024-09-24 RX ADMIN — AMITRIPTYLINE HYDROCHLORIDE 10 MG: 10 TABLET, FILM COATED ORAL at 20:35

## 2024-09-24 RX ADMIN — OXCARBAZEPINE 600 MG: 150 TABLET, FILM COATED ORAL at 09:58

## 2024-09-24 ASSESSMENT — COGNITIVE AND FUNCTIONAL STATUS - GENERAL
MOVING TO AND FROM BED TO CHAIR: A LITTLE
WALKING IN HOSPITAL ROOM: A LITTLE
HELP NEEDED FOR BATHING: A LITTLE
TURNING FROM BACK TO SIDE WHILE IN FLAT BAD: A LITTLE
TOILETING: A LITTLE
DRESSING REGULAR UPPER BODY CLOTHING: A LITTLE
DRESSING REGULAR LOWER BODY CLOTHING: A LITTLE
DAILY ACTIVITIY SCORE: 19
HELP NEEDED FOR BATHING: A LITTLE
CLIMB 3 TO 5 STEPS WITH RAILING: A LITTLE
TURNING FROM BACK TO SIDE WHILE IN FLAT BAD: A LITTLE
WALKING IN HOSPITAL ROOM: A LITTLE
DAILY ACTIVITIY SCORE: 19
CLIMB 3 TO 5 STEPS WITH RAILING: A LITTLE
MOBILITY SCORE: 18
STANDING UP FROM CHAIR USING ARMS: A LITTLE
DRESSING REGULAR LOWER BODY CLOTHING: A LITTLE
TOILETING: A LITTLE
MOBILITY SCORE: 18
MOVING FROM LYING ON BACK TO SITTING ON SIDE OF FLAT BED WITH BEDRAILS: A LITTLE
STANDING UP FROM CHAIR USING ARMS: A LITTLE
MOVING TO AND FROM BED TO CHAIR: A LITTLE
MOVING FROM LYING ON BACK TO SITTING ON SIDE OF FLAT BED WITH BEDRAILS: A LITTLE
PERSONAL GROOMING: A LITTLE
PERSONAL GROOMING: A LITTLE
DRESSING REGULAR UPPER BODY CLOTHING: A LITTLE

## 2024-09-24 ASSESSMENT — PAIN - FUNCTIONAL ASSESSMENT: PAIN_FUNCTIONAL_ASSESSMENT: 0-10

## 2024-09-24 ASSESSMENT — PAIN SCALES - GENERAL
PAINLEVEL_OUTOF10: 0 - NO PAIN

## 2024-09-24 NOTE — PROGRESS NOTES
"Timo Crawford is a 79 y.o. male on day 1 of admission presenting with Unresponsive episode.      Subjective   MRI brain/ MRA head and neck and EEG were both done yesterday.  MRI brain showed moderate volume loss consistent with dementia.   EEGT showed moderate diffuse encephalopathy.  Patient is more sleepy today.  He is being tested for COVID.    Objective     Last Recorded Vitals  Blood pressure 125/58, pulse 57, temperature 36 °C (96.8 °F), temperature source Temporal, resp. rate 16, height 1.803 m (5' 11\"), weight 75.8 kg (167 lb), SpO2 96%.    Appearance:  sleeping in recliner chair  HEENT: normocephalic abnormal movements seen.  Cardiovascular/Lungs/Abdomen: No carotid bruits to auscultation bilaterally, heart is regular in rate and rhythm.  Extremities/Skin: no peripheral edema.    NEUROLOGICAL EXAMINATION:    Mental status: More lethargic.    Cranial nerves:    II/III: Visual fields are full. Pupils are 2 mm and reactive bilaterally.  III/IV/VI: Extraocular movements are full with no nystagmus.   V: Facial sensation is intact to light touch.  VII: Face is symmetric.  VIII: hearing is intact bilaterally.  IX/X: Palate elevates symmetrically to phonation.  XI: Sternocleidomastoid is MRC 5/5 to strength testing.  XII: Tongue is midline.    Motor exam: No abnormal movements seen.      Relevant Results  Scheduled medications  amitriptyline, 10 mg, oral, Nightly  aspirin, 81 mg, oral, Daily  atorvastatin, 80 mg, oral, Nightly  dorzolamide-timoloL, 1 drop, Right Eye, BID  enoxaparin, 40 mg, subcutaneous, q24h  finasteride, 5 mg, oral, Daily  [Held by provider] hydroCHLOROthiazide, 25 mg, oral, Daily  melatonin, 5 mg, oral, Nightly  metoprolol succinate XL, 25 mg, oral, Daily  OXcarbazepine, 600 mg, oral, BID  perflutren lipid microspheres, 0.5-10 mL of dilution, intravenous, Once in imaging  perflutren protein A microsphere, 0.5 mL, intravenous, Once in imaging  sulfur hexafluoride microsphr, 2 mL, intravenous, " Once in imaging      Continuous medications     PRN medications  PRN medications: acetaminophen **OR** acetaminophen **OR** acetaminophen, benzocaine-menthol, haloperidol lactate, polyethylene glycol  Results for orders placed or performed during the hospital encounter of 09/21/24 (from the past 24 hour(s))   Transthoracic Echo (TTE) Complete   Result Value Ref Range    AV pk letitia 1.22 m/s    AV mn grad 4.0 mmHg    LVOT diam 2.30 cm    MV E/A ratio 1.33     LA vol index A/L 19.7 ml/m2    Tricuspid annular plane systolic excursion 1.8 cm    LV EF 58 %    RV free wall pk S' 11.30 cm/s    LVIDd 3.90 cm    RVSP 24.2 mmHg    Aortic Valve Area by Continuity of VTI 3.31 cm2    Aortic Valve Area by Continuity of Peak Velocity 3.40 cm2    AV pk grad 6.0 mmHg    LV A4C EF 66.8    Holter Or Event Cardiac Monitor   Result Value Ref Range    BSA 1.95 m2   CBC   Result Value Ref Range    WBC 6.8 4.4 - 11.3 x10*3/uL    nRBC 0.0 0.0 - 0.0 /100 WBCs    RBC 3.77 (L) 4.50 - 5.90 x10*6/uL    Hemoglobin 12.1 (L) 13.5 - 17.5 g/dL    Hematocrit 35.4 (L) 41.0 - 52.0 %    MCV 94 80 - 100 fL    MCH 32.1 26.0 - 34.0 pg    MCHC 34.2 32.0 - 36.0 g/dL    RDW 13.7 11.5 - 14.5 %    Platelets 255 150 - 450 x10*3/uL   Basic metabolic panel   Result Value Ref Range    Glucose 89 74 - 99 mg/dL    Sodium 134 (L) 136 - 145 mmol/L    Potassium 3.6 3.5 - 5.3 mmol/L    Chloride 102 98 - 107 mmol/L    Bicarbonate 27 21 - 32 mmol/L    Anion Gap 9 (L) 10 - 20 mmol/L    Urea Nitrogen 18 6 - 23 mg/dL    Creatinine 0.81 0.50 - 1.30 mg/dL    eGFR 89 >60 mL/min/1.73m*2    Calcium 8.6 8.6 - 10.3 mg/dL   Magnesium   Result Value Ref Range    Magnesium 1.72 1.60 - 2.40 mg/dL      ECG 12 lead    Result Date: 9/23/2024  Sinus rhythm RBBB and LAFB    CT head wo IV contrast    Result Date: 9/21/2024  Interpreted By:  Marck Henry, STUDY: CT HEAD WO IV CONTRAST;  9/21/2024 1:08 pm   INDICATION: Signs/Symptoms:syncope vs seizure.   COMPARISON: CT head 02/06/2024.    ACCESSION NUMBER(S): [id number]   ORDERING CLINICIAN: DAVINA JOLLEY   TECHNIQUE: Noncontrast axial CT scan of head was performed.   FINDINGS: Parenchyma: No intracranial hemorrhage. The grey-white differentiation is intact. No mass effect or midline shift. Scattered periventricular white matter hypodensities, likely chronic microvascular ischemic change.   CSF Spaces: The ventricles, sulci and basal cisterns are within normal limits for age.   Extra-Axial Fluid: None.   Calvarium: No acute depressed fracture.   Paranasal sinuses: Visualized paranasal sinuses are clear.   Mastoids: Clear.   Orbits: No acute abnormality.   Soft tissues: No acute abnormality.       No acute intracranial hemorrhage or depressed calvarial fracture. Consider MRI for evaluation of possible occult seizure focus as warranted.   MACRO None   Signed by: Marck Henry 9/21/2024 2:13 PM Dictation workstation:   [id number]    XR chest 2 views    Result Date: 9/21/2024  Interpreted By:  Maru Garcia, STUDY: XR CHEST 2 VIEWS 9/21/2024 1:28 pm   INDICATION: Syncope vs seizure versus seizure   COMPARISON: 05/13/2022   ACCESSION NUMBER(S): [id number]   ORDERING CLINICIAN: DAVINA JOLLEY   TECHNIQUE: PA and lateral views of the chest were acquired.   FINDINGS: Cardiac size is normal with calcified plaque visible within the aortic knob. There is a stable calcified granuloma within the right upper lobe with lungs otherwise clear. No pleural abnormality is seen.   There are degenerative changes of the thoracic spine.       No acute cardiopulmonary disease.        Assessment/Plan:   Dementia, ? Seizure.    MRI brain showed moderate volume loss consistent with dementia.   EEGT showed moderate diffuse encephalopathy.  Continue Trileptal 600mg BID.    Monitor electrolytes.   Work up for metabolic/infectious etiology in process.  Continue Seizure precautions.  Needs 24/7 supervision due to his dementia.    Will need follow up with his neurologist,   Ede on discharge.    Please call neurology on-call for any questions or concerns.

## 2024-09-24 NOTE — CARE PLAN
Problem: Safety - Adult  Goal: Free from fall injury  Outcome: Progressing     Problem: Pain - Adult  Goal: Verbalizes/displays adequate comfort level or baseline comfort level  Outcome: Progressing     Problem: Chronic Conditions and Co-morbidities  Goal: Patient's chronic conditions and co-morbidity symptoms are monitored and maintained or improved  Outcome: Progressing     Problem: Fall/Injury  Goal: Not fall by end of shift  Outcome: Progressing     Problem: Discharge Planning  Goal: Discharge to home or other facility with appropriate resources  Outcome: Progressing

## 2024-09-24 NOTE — CONSULTS
"Nutrition Initial Assessment:   Nutrition Assessment    Reason for Assessment: Admission nursing screening (MST=2 for unsure of weight loss)    Patient is a 79 y.o. male presenting with unresponsive episode    PmHx: CAD, HTN, HLD, hypomagnesemia, dementia, herpetic neuralgia    Nutrition History:  Energy Intake: Fair 50-75 %  Food and Nutrient History: Pt sitting in chair sleeping at time of visit today.  Opened eyes when knocked on door.  Pt confused.  Unable to provide any meaningful history.  Appeared that pt had eating ~50% of breakfast.  Vitamin/Herbal Supplement Use: none noted  Food Allergies/Intolerances:  None  GI Symptoms:  ROSE  Oral Problems:  ROSE        Anthropometrics:  Height: 180.3 cm (5' 10.98\")   Weight: 75.8 kg (167 lb 1.7 oz)   BMI (Calculated): 23.32  IBW/kg (Dietitian Calculated): 78.2 kg  Percent of IBW: 97 %       Weight History:     Weight Change %:  Weight History / % Weight Change: 8/29 75.9kg, 2/7 83.1kg  Significant Weight Loss: No    Nutrition Focused Physical Exam Findings:  defer: confused   Subcutaneous Fat Loss:   Orbital Fat Pads: Defer (confused)  Muscle Wasting:     Edema:  Edema: none  Physical Findings:  Skin: Negative    Nutrition Significant Labs:  CBC Trend:   Results from last 7 days   Lab Units 09/24/24  0724 09/23/24  0724 09/22/24  0620 09/21/24  1236   WBC AUTO x10*3/uL 6.8 7.2 6.6 8.3   RBC AUTO x10*6/uL 3.77* 3.66* 3.77* 4.23*   HEMOGLOBIN g/dL 12.1* 11.8* 12.2* 13.6   HEMATOCRIT % 35.4* 34.1* 35.1* 39.5*   MCV fL 94 93 93 93   PLATELETS AUTO x10*3/uL 255 264 265 337    , BMP Trend:   Results from last 7 days   Lab Units 09/24/24  0724 09/23/24  0723 09/22/24  0620 09/21/24  1236   GLUCOSE mg/dL 89 92 87 112*   CALCIUM mg/dL 8.6 8.6 8.8 9.4   SODIUM mmol/L 134* 135* 136 136   POTASSIUM mmol/L 3.6 3.5 3.2* 3.6   CO2 mmol/L 27 27 30 27   CHLORIDE mmol/L 102 101 100 101   BUN mg/dL 18 18 18 20   CREATININE mg/dL 0.81 0.84 0.78 0.95    , BG POCT trend:   Results from last " 7 days   Lab Units 09/21/24  1228   POCT GLUCOSE mg/dL 108*    , Renal Lab Trend:   Results from last 7 days   Lab Units 09/24/24  0724 09/23/24  0723 09/22/24  0620 09/21/24  1236   POTASSIUM mmol/L 3.6 3.5 3.2* 3.6   SODIUM mmol/L 134* 135* 136 136   MAGNESIUM mg/dL 1.72 1.44* 1.75 1.59*   EGFR mL/min/1.73m*2 89 88 90 81   BUN mg/dL 18 18 18 20   CREATININE mg/dL 0.81 0.84 0.78 0.95        Nutrition Specific Medications:  Reviewed     I/O:   Last BM Date: 09/23/24; Stool Appearance: Formed (09/21/24 1800)    Dietary Orders (From admission, onward)       Start     Ordered    09/24/24 1113  Oral nutritional supplements  Until discontinued        Question Answer Comment   Deliver with Breakfast    Deliver with Dinner    Select supplement: Mighty Shake        09/24/24 1113    09/24/24 1113  Oral nutritional supplements  Until discontinued        Question Answer Comment   Deliver with Lunch    Select supplement: Magic Cup        09/24/24 1113    09/21/24 1600  Adult diet Regular  Diet effective now        Question:  Diet type  Answer:  Regular    09/21/24 2022                     Estimated Needs:      Method for Estimating Needs: 1900-2050kcals (25-27kcals/kg ABW)     Method for Estimating Needs: 75-83g (1.0-1.1g/kg ABW)     Method for Estimating Needs: 1 mL/kcal or as per MD        Nutrition Diagnosis   Malnutrition Diagnosis  Patient has Malnutrition Diagnosis:  (unable to determine at this time)    Nutrition Diagnosis  Patient has Nutrition Diagnosis: Yes  Diagnosis Status (1): New  Nutrition Diagnosis 1: Inadequate oral intake  Related to (1): acute on chronic illness; confusion  As Evidenced by (1): 50-75% x2 meals so far       Nutrition Interventions/Recommendations         Nutrition Prescription:  Individualized Nutrition Prescription Provided for : Regular diet as ordered. Will order Mighty shakes twice daily and Magic cup once daily        Nutrition Interventions:   Interventions: Meals and snacks, Medical  food supplement  Meals and Snacks: General healthful diet  Goal: consume >75% of meals  Medical Food Supplement: Commercial beverage, Commercial food  Goal: consume >75% of Mighty shakes twice daily (for an additional 220 kcals, 6 gm protein each)  Additional Interventions: consume >75% of Magic cup once daily (for an additional 290 kcals, 9 gm protein each)    Collaboration and Referral of Nutrition Care:  (spoke with pt briefly)    Nutrition Education:   N/A       Nutrition Monitoring and Evaluation   Food/Nutrient Related History Monitoring  Monitoring and Evaluation Plan: Energy intake, Fluid intake, Amount of food  Energy Intake: Estimated energy intake  Criteria: Meal/ONS intake to meet >75% of estimated needs  Fluid Intake: Estimated fluid intake  Criteria: fluid intake to meet >75% of estimated need  Amount of Food: Estimated amout of food, Medical food intake  Criteria: Pt to consume >75% of meals/ONS    Body Composition/Growth/Weight History  Monitoring and Evaluation Plan: Weight  Weight: Measured weight  Criteria: reweigh at least every 5 days    Biochemical Data, Medical Tests and Procedures  Monitoring and Evaluation Plan: Electrolyte/renal panel  Electrolyte and Renal Panel: Magnesium, Sodium  Criteria: labs WNL              Time Spent (min): 45 minutes

## 2024-09-24 NOTE — PROGRESS NOTES
Timo Crawford is a 79 y.o. male on day 1 of admission presenting with Unresponsive episode.      Subjective   Pleasantly confused, resting comfortably in chair at side of bed     Objective     Last Recorded Vitals  /58 (BP Location: Right arm, Patient Position: Sitting)   Pulse 57   Temp 36 °C (96.8 °F) (Temporal)   Resp 16   Wt 75.8 kg (167 lb 1.7 oz)   SpO2 96%     G: aox1, pleasantly confused NAD, cooperative  HENT: neck supple, no JVD, MMM  Eyes: clear sclera  CV: RRR s1 s2  L: clear  Abd: soft, NT, non distended  Ext: no c/c/e  N: no appreciable acute focal deficits  Psych: appropriate mood and behavior    Assessment & Plan    Syncope vs seizure  Dementia    Hypomagnesemia  Herpetic neuralgia  CAD  HTN, HLD  DVT ppx  Full code    Plan:  - MRI unremarkable, EEG no seizure activity, consistent with mild-mod diffuse encephalopathy, ECHO no significant pathology, tele without significant arrhythmia, Zio patch, orthostatics negative. Neurology following and increased Trileptal dose, will follow up further recs  - Will check UA and COVID to ensure no infectious  process playing role, CXR clear, will check UA and COVID  - Will touch base with pt's family regarding baseline mentation, he may be at baseline with his dementia  - PT/OT evals  - Can stop hydrochlorothiazide permanently  - Continue ASA, statin, Sapphire Alva, DO

## 2024-09-25 VITALS
RESPIRATION RATE: 16 BRPM | HEART RATE: 74 BPM | WEIGHT: 167.11 LBS | BODY MASS INDEX: 23.4 KG/M2 | DIASTOLIC BLOOD PRESSURE: 73 MMHG | TEMPERATURE: 97.7 F | OXYGEN SATURATION: 96 % | HEIGHT: 71 IN | SYSTOLIC BLOOD PRESSURE: 135 MMHG

## 2024-09-25 PROBLEM — R56.9 SEIZURE (MULTI): Status: RESOLVED | Noted: 2024-09-22 | Resolved: 2024-09-25

## 2024-09-25 PROBLEM — R40.4 UNRESPONSIVE EPISODE: Status: RESOLVED | Noted: 2024-09-21 | Resolved: 2024-09-25

## 2024-09-25 LAB
ANION GAP SERPL CALC-SCNC: 8 MMOL/L (ref 10–20)
BUN SERPL-MCNC: 20 MG/DL (ref 6–23)
CALCIUM SERPL-MCNC: 9 MG/DL (ref 8.6–10.3)
CHLORIDE SERPL-SCNC: 102 MMOL/L (ref 98–107)
CO2 SERPL-SCNC: 29 MMOL/L (ref 21–32)
CREAT SERPL-MCNC: 0.96 MG/DL (ref 0.5–1.3)
EGFRCR SERPLBLD CKD-EPI 2021: 80 ML/MIN/1.73M*2
ERYTHROCYTE [DISTWIDTH] IN BLOOD BY AUTOMATED COUNT: 13.6 % (ref 11.5–14.5)
GLUCOSE SERPL-MCNC: 88 MG/DL (ref 74–99)
HCT VFR BLD AUTO: 37.3 % (ref 41–52)
HGB BLD-MCNC: 12.2 G/DL (ref 13.5–17.5)
MAGNESIUM SERPL-MCNC: 1.68 MG/DL (ref 1.6–2.4)
MCH RBC QN AUTO: 31 PG (ref 26–34)
MCHC RBC AUTO-ENTMCNC: 32.7 G/DL (ref 32–36)
MCV RBC AUTO: 95 FL (ref 80–100)
NRBC BLD-RTO: 0 /100 WBCS (ref 0–0)
PLATELET # BLD AUTO: 237 X10*3/UL (ref 150–450)
POTASSIUM SERPL-SCNC: 3.7 MMOL/L (ref 3.5–5.3)
RBC # BLD AUTO: 3.93 X10*6/UL (ref 4.5–5.9)
SODIUM SERPL-SCNC: 135 MMOL/L (ref 136–145)
WBC # BLD AUTO: 8.3 X10*3/UL (ref 4.4–11.3)

## 2024-09-25 PROCEDURE — 85027 COMPLETE CBC AUTOMATED: CPT | Performed by: INTERNAL MEDICINE

## 2024-09-25 PROCEDURE — 99239 HOSP IP/OBS DSCHRG MGMT >30: CPT | Performed by: STUDENT IN AN ORGANIZED HEALTH CARE EDUCATION/TRAINING PROGRAM

## 2024-09-25 PROCEDURE — 2500000002 HC RX 250 W HCPCS SELF ADMINISTERED DRUGS (ALT 637 FOR MEDICARE OP, ALT 636 FOR OP/ED): Performed by: PSYCHIATRY & NEUROLOGY

## 2024-09-25 PROCEDURE — 36415 COLL VENOUS BLD VENIPUNCTURE: CPT | Performed by: INTERNAL MEDICINE

## 2024-09-25 PROCEDURE — 2500000001 HC RX 250 WO HCPCS SELF ADMINISTERED DRUGS (ALT 637 FOR MEDICARE OP): Performed by: INTERNAL MEDICINE

## 2024-09-25 PROCEDURE — 83735 ASSAY OF MAGNESIUM: CPT | Performed by: INTERNAL MEDICINE

## 2024-09-25 PROCEDURE — 80048 BASIC METABOLIC PNL TOTAL CA: CPT | Performed by: INTERNAL MEDICINE

## 2024-09-25 RX ORDER — OXCARBAZEPINE 150 MG/1
600 TABLET, FILM COATED ORAL 2 TIMES DAILY
Qty: 240 TABLET | Refills: 1 | Status: SHIPPED | OUTPATIENT
Start: 2024-09-25 | End: 2024-11-24

## 2024-09-25 RX ADMIN — OXCARBAZEPINE 600 MG: 150 TABLET, FILM COATED ORAL at 10:06

## 2024-09-25 RX ADMIN — FINASTERIDE 5 MG: 5 TABLET, FILM COATED ORAL at 10:05

## 2024-09-25 RX ADMIN — ASPIRIN 81 MG CHEWABLE TABLET 81 MG: 81 TABLET CHEWABLE at 10:04

## 2024-09-25 RX ADMIN — DORZOLAMIDE HYDROCHLORIDE TIMOLOL MALEATE 1 DROP: 20; 5 SOLUTION/ DROPS OPHTHALMIC at 12:41

## 2024-09-25 SDOH — ECONOMIC STABILITY: INCOME INSECURITY: HOW HARD IS IT FOR YOU TO PAY FOR THE VERY BASICS LIKE FOOD, HOUSING, MEDICAL CARE, AND HEATING?: PATIENT DECLINED

## 2024-09-25 SDOH — SOCIAL STABILITY: SOCIAL INSECURITY
WITHIN THE LAST YEAR, HAVE TO BEEN RAPED OR FORCED TO HAVE ANY KIND OF SEXUAL ACTIVITY BY YOUR PARTNER OR EX-PARTNER?: PATIENT DECLINED

## 2024-09-25 SDOH — ECONOMIC STABILITY: TRANSPORTATION INSECURITY
IN THE PAST 12 MONTHS, HAS THE LACK OF TRANSPORTATION KEPT YOU FROM MEDICAL APPOINTMENTS OR FROM GETTING MEDICATIONS?: PATIENT DECLINED

## 2024-09-25 SDOH — SOCIAL STABILITY: SOCIAL INSECURITY
WITHIN THE LAST YEAR, HAVE YOU BEEN HUMILIATED OR EMOTIONALLY ABUSED IN OTHER WAYS BY YOUR PARTNER OR EX-PARTNER?: PATIENT DECLINED

## 2024-09-25 SDOH — SOCIAL STABILITY: SOCIAL INSECURITY: WITHIN THE LAST YEAR, HAVE YOU BEEN AFRAID OF YOUR PARTNER OR EX-PARTNER?: PATIENT DECLINED

## 2024-09-25 SDOH — ECONOMIC STABILITY: HOUSING INSECURITY: AT ANY TIME IN THE PAST 12 MONTHS, WERE YOU HOMELESS OR LIVING IN A SHELTER (INCLUDING NOW)?: PATIENT DECLINED

## 2024-09-25 SDOH — ECONOMIC STABILITY: FOOD INSECURITY: WITHIN THE PAST 12 MONTHS, THE FOOD YOU BOUGHT JUST DIDN'T LAST AND YOU DIDN'T HAVE MONEY TO GET MORE.: PATIENT DECLINED

## 2024-09-25 SDOH — ECONOMIC STABILITY: FOOD INSECURITY: WITHIN THE PAST 12 MONTHS, YOU WORRIED THAT YOUR FOOD WOULD RUN OUT BEFORE YOU GOT MONEY TO BUY MORE.: PATIENT DECLINED

## 2024-09-25 SDOH — ECONOMIC STABILITY: HOUSING INSECURITY: IN THE PAST 12 MONTHS, HOW MANY TIMES HAVE YOU MOVED WHERE YOU WERE LIVING?: 0

## 2024-09-25 SDOH — SOCIAL STABILITY: SOCIAL INSECURITY
WITHIN THE LAST YEAR, HAVE YOU BEEN KICKED, HIT, SLAPPED, OR OTHERWISE PHYSICALLY HURT BY YOUR PARTNER OR EX-PARTNER?: PATIENT DECLINED

## 2024-09-25 SDOH — ECONOMIC STABILITY: INCOME INSECURITY
IN THE PAST 12 MONTHS, HAS THE ELECTRIC, GAS, OIL, OR WATER COMPANY THREATENED TO SHUT OFF SERVICE IN YOUR HOME?: PATIENT DECLINED

## 2024-09-25 SDOH — ECONOMIC STABILITY: TRANSPORTATION INSECURITY
IN THE PAST 12 MONTHS, HAS LACK OF TRANSPORTATION KEPT YOU FROM MEETINGS, WORK, OR FROM GETTING THINGS NEEDED FOR DAILY LIVING?: PATIENT DECLINED

## 2024-09-25 SDOH — ECONOMIC STABILITY: INCOME INSECURITY: IN THE LAST 12 MONTHS, WAS THERE A TIME WHEN YOU WERE NOT ABLE TO PAY THE MORTGAGE OR RENT ON TIME?: PATIENT DECLINED

## 2024-09-25 ASSESSMENT — COGNITIVE AND FUNCTIONAL STATUS - GENERAL
MOVING FROM LYING ON BACK TO SITTING ON SIDE OF FLAT BED WITH BEDRAILS: A LITTLE
CLIMB 3 TO 5 STEPS WITH RAILING: A LITTLE
TOILETING: A LITTLE
MOBILITY SCORE: 18
DRESSING REGULAR LOWER BODY CLOTHING: A LITTLE
TURNING FROM BACK TO SIDE WHILE IN FLAT BAD: A LITTLE
STANDING UP FROM CHAIR USING ARMS: A LITTLE
DAILY ACTIVITIY SCORE: 19
MOVING TO AND FROM BED TO CHAIR: A LITTLE
WALKING IN HOSPITAL ROOM: A LITTLE
HELP NEEDED FOR BATHING: A LITTLE
DRESSING REGULAR UPPER BODY CLOTHING: A LITTLE
PERSONAL GROOMING: A LITTLE

## 2024-09-25 ASSESSMENT — PAIN SCALES - GENERAL: PAINLEVEL_OUTOF10: 0 - NO PAIN

## 2024-09-25 ASSESSMENT — PAIN - FUNCTIONAL ASSESSMENT: PAIN_FUNCTIONAL_ASSESSMENT: 0-10

## 2024-09-25 NOTE — CARE PLAN
The patient's goals for the shift include      The clinical goals for the shift include pt will remain safe and free from falls and injury    Over the shift, the patient did not make progress toward the following goals. Barriers to progression include confusion, weakness. Recommendations to address these barriers include PT/OT, assistance as needed.

## 2024-09-25 NOTE — DISCHARGE SUMMARY
Discharge Diagnosis  Unresponsive episode    Discharge Meds     Medication List      CHANGE how you take these medications     OXcarbazepine 150 mg tablet; Commonly known as: Trileptal; Take 4   tablets (600 mg) by mouth 2 times a day.; What changed: how much to take     CONTINUE taking these medications     acetaminophen 325 mg tablet; Commonly known as: Tylenol   amitriptyline 10 mg tablet; Commonly known as: Elavil   aspirin 81 mg chewable tablet   atorvastatin 80 mg tablet; Commonly known as: Lipitor   Boost Breeze NutritionaL 0.04-1.05 gram-kcal/mL liquid; Generic drug:   food supplemt, lactose-reduced   CeraVe cleanser cleanser; Generic drug: ceramides cleanser   dorzolamide-timoloL 22.3-6.8 mg/mL ophthalmic solution; Commonly known   as: Cosopt   emollient lotion   finasteride 5 mg tablet; Commonly known as: Proscar   magnesium glycinate 100 mg magnesium capsule   OcuSoft Lid Scrub Plus pads, medicated; Generic drug: eyelid cleanser   combination 3   prednisoLONE acetate 1 % ophthalmic suspension; Commonly known as:   Pred-Forte   Thera-GeL 0.5 % shampoo; Generic drug: coal tar     STOP taking these medications     hydroCHLOROthiazide 25 mg tablet; Commonly known as: HYDRODiuril   metoprolol succinate XL 25 mg 24 hr tablet; Commonly known as: Toprol-XL   potassium chloride CR 20 mEq ER tablet; Commonly known as: Klor-Con M20       Hospital Course   78 yo M with PMHx of dementia, HTN, HLD, CAD, and dysphagia presents after an episode of unresponsiveness with questionable report of possible convulsions and loss of bladder function. Orthostatics negative, MRI brain unremarkable, and neurology was consulted. EEG showed   no seizure activity and was consistent with mild-mod diffuse encephalopathy. He did have several episodes of asymptomatic mild bradycardia on tele and his toprol XL was discontinued. Neurology did increase his Trileptal from 300mg BID to 600mg BID. On day of discharge pt at baseline with no  complaints and stable for discharge back to his assisted living memory care unit.     35 min    Pertinent Physical Exam At Time of Discharge  Physical Exam  G: aox1, pleasantly confused NAD, cooperative  HENT: neck supple, no JVD, MMM  Eyes: clear sclera  CV: RRR s1 s2  L: clear  Abd: soft, NT, non distended  Ext: no c/c/e  N: no appreciable acute focal deficits  Psych: appropriate mood and behavior    Outpatient Follow-Up  Future Appointments   Date Time Provider Department Center   3/26/2025 10:30 AM Anne Mera DO [id number] [place]         Mauro Alva DO

## 2024-09-25 NOTE — DISCHARGE INSTRUCTIONS
STOP TAKING YOUR METOPROLOL, THIS IS DUE TO YOUR LOWER HEART RATE    STOP TAKING YOUR DIURETIC HYDROCHLOROTHIAZIDE AND YOUR POTASSIUM SUPPLEMENT    YOUR BLOOD PRESSURE HAS BEEN WELL CONTROLLED WITHOUT THE ABOVE MEDICATIONS     THE NEUROLOGIST INCREASED YOUR TRILEPTAL (OXCARBAZEPINE) DOSE FROM 300MG TWICE DAILY TO 600MG TWICE DAILY

## 2024-10-17 LAB — BODY SURFACE AREA: 1.95 M2

## 2024-10-22 LAB — BODY SURFACE AREA: 1.95 M2

## 2025-03-20 ENCOUNTER — APPOINTMENT (OUTPATIENT)
Dept: NEUROLOGY | Facility: CLINIC | Age: 80
End: 2025-03-20
Payer: MEDICARE

## 2025-03-26 ENCOUNTER — APPOINTMENT (OUTPATIENT)
Dept: NEUROLOGY | Facility: CLINIC | Age: 80
End: 2025-03-26
Payer: MEDICARE